# Patient Record
Sex: MALE | Race: WHITE | NOT HISPANIC OR LATINO | Employment: FULL TIME | URBAN - METROPOLITAN AREA
[De-identification: names, ages, dates, MRNs, and addresses within clinical notes are randomized per-mention and may not be internally consistent; named-entity substitution may affect disease eponyms.]

---

## 2019-09-14 ENCOUNTER — APPOINTMENT (OUTPATIENT)
Dept: GENERAL RADIOLOGY | Facility: HOSPITAL | Age: 34
End: 2019-09-14

## 2019-09-14 PROCEDURE — 73140 X-RAY EXAM OF FINGER(S): CPT | Performed by: GENERAL PRACTICE

## 2021-08-13 ENCOUNTER — HOSPITAL ENCOUNTER (EMERGENCY)
Facility: HOSPITAL | Age: 36
Discharge: HOME | End: 2021-08-13
Attending: EMERGENCY MEDICINE | Admitting: EMERGENCY MEDICINE

## 2021-08-13 VITALS
SYSTOLIC BLOOD PRESSURE: 108 MMHG | WEIGHT: 222.5 LBS | OXYGEN SATURATION: 95 % | RESPIRATION RATE: 16 BRPM | TEMPERATURE: 98.7 F | HEART RATE: 54 BPM | DIASTOLIC BLOOD PRESSURE: 69 MMHG | BODY MASS INDEX: 28.55 KG/M2 | HEIGHT: 74 IN

## 2021-08-13 DIAGNOSIS — B34.9 VIRAL ILLNESS: Primary | ICD-10-CM

## 2021-08-13 LAB
ALBUMIN SERPL-MCNC: 3.9 G/DL (ref 3.5–5.2)
ALBUMIN/GLOB SERPL: 1.3 G/DL
ALP SERPL-CCNC: 52 U/L (ref 39–117)
ALT SERPL W P-5'-P-CCNC: 18 U/L (ref 1–41)
ANION GAP SERPL CALCULATED.3IONS-SCNC: 7.8 MMOL/L (ref 5–15)
AST SERPL-CCNC: 18 U/L (ref 1–40)
BASOPHILS # BLD AUTO: 0.02 10*3/MM3 (ref 0–0.2)
BASOPHILS NFR BLD AUTO: 0.3 % (ref 0–1.5)
BILIRUB SERPL-MCNC: 1.1 MG/DL (ref 0–1.2)
BILIRUB UR QL STRIP: NEGATIVE
BUN SERPL-MCNC: 16 MG/DL (ref 6–20)
BUN/CREAT SERPL: 13.1 (ref 7–25)
CALCIUM SPEC-SCNC: 9 MG/DL (ref 8.6–10.5)
CHLORIDE SERPL-SCNC: 99 MMOL/L (ref 98–107)
CLARITY UR: CLEAR
CO2 SERPL-SCNC: 28.2 MMOL/L (ref 22–29)
COLOR UR: YELLOW
CREAT SERPL-MCNC: 1.22 MG/DL (ref 0.76–1.27)
DEPRECATED RDW RBC AUTO: 41.1 FL (ref 37–54)
EOSINOPHIL # BLD AUTO: 0.06 10*3/MM3 (ref 0–0.4)
EOSINOPHIL NFR BLD AUTO: 0.8 % (ref 0.3–6.2)
ERYTHROCYTE [DISTWIDTH] IN BLOOD BY AUTOMATED COUNT: 12.7 % (ref 12.3–15.4)
FLUAV RNA RESP QL NAA+PROBE: NOT DETECTED
FLUBV RNA RESP QL NAA+PROBE: NOT DETECTED
GFR SERPL CREATININE-BSD FRML MDRD: 67 ML/MIN/1.73
GLOBULIN UR ELPH-MCNC: 2.9 GM/DL
GLUCOSE SERPL-MCNC: 106 MG/DL (ref 65–99)
GLUCOSE UR STRIP-MCNC: NEGATIVE MG/DL
HCT VFR BLD AUTO: 44.2 % (ref 37.5–51)
HETEROPH AB SER QL LA: NEGATIVE
HGB BLD-MCNC: 14.7 G/DL (ref 13–17.7)
HGB UR QL STRIP.AUTO: NEGATIVE
IMM GRANULOCYTES # BLD AUTO: 0.01 10*3/MM3 (ref 0–0.05)
IMM GRANULOCYTES NFR BLD AUTO: 0.1 % (ref 0–0.5)
KETONES UR QL STRIP: NEGATIVE
LEUKOCYTE ESTERASE UR QL STRIP.AUTO: NEGATIVE
LIPASE SERPL-CCNC: 21 U/L (ref 13–60)
LYMPHOCYTES # BLD AUTO: 0.46 10*3/MM3 (ref 0.7–3.1)
LYMPHOCYTES NFR BLD AUTO: 6.1 % (ref 19.6–45.3)
MCH RBC QN AUTO: 29.3 PG (ref 26.6–33)
MCHC RBC AUTO-ENTMCNC: 33.3 G/DL (ref 31.5–35.7)
MCV RBC AUTO: 88 FL (ref 79–97)
MONOCYTES # BLD AUTO: 0.19 10*3/MM3 (ref 0.1–0.9)
MONOCYTES NFR BLD AUTO: 2.5 % (ref 5–12)
NEUTROPHILS NFR BLD AUTO: 6.83 10*3/MM3 (ref 1.7–7)
NEUTROPHILS NFR BLD AUTO: 90.2 % (ref 42.7–76)
NITRITE UR QL STRIP: NEGATIVE
NRBC BLD AUTO-RTO: 0 /100 WBC (ref 0–0.2)
PH UR STRIP.AUTO: <=5 [PH] (ref 4.5–8)
PLATELET # BLD AUTO: 168 10*3/MM3 (ref 140–450)
PMV BLD AUTO: 8.7 FL (ref 6–12)
POTASSIUM SERPL-SCNC: 3.9 MMOL/L (ref 3.5–5.2)
PROT SERPL-MCNC: 6.8 G/DL (ref 6–8.5)
PROT UR QL STRIP: NEGATIVE
RBC # BLD AUTO: 5.02 10*6/MM3 (ref 4.14–5.8)
SARS-COV-2 RNA RESP QL NAA+PROBE: NOT DETECTED
SODIUM SERPL-SCNC: 135 MMOL/L (ref 136–145)
SP GR UR STRIP: 1.02 (ref 1–1.03)
UROBILINOGEN UR QL STRIP: NORMAL
WBC # BLD AUTO: 7.57 10*3/MM3 (ref 3.4–10.8)

## 2021-08-13 PROCEDURE — 81003 URINALYSIS AUTO W/O SCOPE: CPT | Performed by: PHYSICIAN ASSISTANT

## 2021-08-13 PROCEDURE — 86308 HETEROPHILE ANTIBODY SCREEN: CPT | Performed by: PHYSICIAN ASSISTANT

## 2021-08-13 PROCEDURE — 85025 COMPLETE CBC W/AUTO DIFF WBC: CPT | Performed by: PHYSICIAN ASSISTANT

## 2021-08-13 PROCEDURE — 96374 THER/PROPH/DIAG INJ IV PUSH: CPT

## 2021-08-13 PROCEDURE — 83690 ASSAY OF LIPASE: CPT | Performed by: PHYSICIAN ASSISTANT

## 2021-08-13 PROCEDURE — 99284 EMERGENCY DEPT VISIT MOD MDM: CPT

## 2021-08-13 PROCEDURE — 80053 COMPREHEN METABOLIC PANEL: CPT | Performed by: PHYSICIAN ASSISTANT

## 2021-08-13 PROCEDURE — 25010000002 ONDANSETRON PER 1 MG: Performed by: PHYSICIAN ASSISTANT

## 2021-08-13 PROCEDURE — 87636 SARSCOV2 & INF A&B AMP PRB: CPT | Performed by: PHYSICIAN ASSISTANT

## 2021-08-13 RX ORDER — ONDANSETRON 2 MG/ML
4 INJECTION INTRAMUSCULAR; INTRAVENOUS ONCE
Status: COMPLETED | OUTPATIENT
Start: 2021-08-13 | End: 2021-08-13

## 2021-08-13 RX ORDER — ACETAMINOPHEN 500 MG
1000 TABLET ORAL ONCE
Status: COMPLETED | OUTPATIENT
Start: 2021-08-13 | End: 2021-08-13

## 2021-08-13 RX ORDER — SODIUM CHLORIDE 0.9 % (FLUSH) 0.9 %
10 SYRINGE (ML) INJECTION AS NEEDED
Status: DISCONTINUED | OUTPATIENT
Start: 2021-08-13 | End: 2021-08-13 | Stop reason: HOSPADM

## 2021-08-13 RX ORDER — IBUPROFEN 400 MG/1
800 TABLET ORAL ONCE
Status: COMPLETED | OUTPATIENT
Start: 2021-08-13 | End: 2021-08-13

## 2021-08-13 RX ORDER — DIPHENOXYLATE HYDROCHLORIDE AND ATROPINE SULFATE 2.5; .025 MG/1; MG/1
1 TABLET ORAL DAILY
COMMUNITY

## 2021-08-13 RX ADMIN — IBUPROFEN 800 MG: 400 TABLET ORAL at 18:31

## 2021-08-13 RX ADMIN — ONDANSETRON 4 MG: 2 INJECTION INTRAMUSCULAR; INTRAVENOUS at 18:41

## 2021-08-13 RX ADMIN — SODIUM CHLORIDE 1000 ML: 9 INJECTION, SOLUTION INTRAVENOUS at 18:42

## 2021-08-13 RX ADMIN — ACETAMINOPHEN 1000 MG: 500 TABLET, FILM COATED ORAL at 18:32

## 2021-08-13 NOTE — ED PROVIDER NOTES
EMERGENCY DEPARTMENT ENCOUNTER      Room Number: 08/08    History is provided by the patient, no translation services needed    HPI:    Chief complaint: Vomiting  Location:     Quality/Severity: More than 5 times     Timing/Duration: 5 AM this morning    Modifying Factors: Worse with eating    Associated Symptoms:  fatigue, chills, diarrhea, sinus pressure, headache    Narrative: Pt is a 36 y.o. male who presents complaining of vomiting and diarrhea since this morning.  Patient states that he is also had fatigue and headache.  Patient is complaining of sinus pressure.  Patient states that he had someone in his office that was COVID-19 positive.  Patient states that he has been vaccinated with the COVID-19 vaccine.  Patient denies any shortness of breath.      PMD: Martin Singer MD    REVIEW OF SYSTEMS  Review of Systems   Constitutional: Positive for activity change, appetite change, chills and fever.   HENT: Positive for congestion, sinus pressure and sinus pain.    Gastrointestinal: Positive for diarrhea, nausea and vomiting.         PAST MEDICAL HISTORY  Active Ambulatory Problems     Diagnosis Date Noted   • No Active Ambulatory Problems     Resolved Ambulatory Problems     Diagnosis Date Noted   • No Resolved Ambulatory Problems     Past Medical History:   Diagnosis Date   • Asthma    • Depression        PAST SURGICAL HISTORY  Past Surgical History:   Procedure Laterality Date   • KNEE SURGERY         FAMILY HISTORY  Family History   Problem Relation Age of Onset   • No Known Problems Mother    • No Known Problems Father    • Asthma Sister    • Stroke Paternal Grandfather        SOCIAL HISTORY  Social History     Socioeconomic History   • Marital status:      Spouse name: Not on file   • Number of children: Not on file   • Years of education: Not on file   • Highest education level: Not on file   Tobacco Use   • Smoking status: Former Smoker   • Smokeless tobacco: Never Used   Substance and Sexual  Activity   • Alcohol use: Yes     Comment: occ   • Drug use: Never   • Sexual activity: Defer       ALLERGIES  Patient has no known allergies.      Current Facility-Administered Medications:   •  [COMPLETED] Insert peripheral IV, , , Once **AND** sodium chloride 0.9 % flush 10 mL, 10 mL, Intravenous, PRN, Nasrin Arshad PA-C    Current Outpatient Medications:   •  multivitamin (THERAGRAN) tablet tablet, Take 1 tablet by mouth Daily., Disp: , Rfl:   •  albuterol sulfate HFA (VENTOLIN HFA) 108 (90 Base) MCG/ACT inhaler, Ventolin HFA 90 mcg/actuation aerosol inhaler, Disp: , Rfl:   •  escitalopram (LEXAPRO) 10 MG tablet, Take 10 mg by mouth Daily., Disp: , Rfl:   •  ibuprofen (ADVIL,MOTRIN) 800 MG tablet, Take 1 tablet by mouth Every 6 (Six) Hours As Needed for Mild Pain  or Moderate Pain ., Disp: 30 tablet, Rfl: 0    PHYSICAL EXAM  ED Triage Vitals [08/13/21 1700]   Temp Heart Rate Resp BP SpO2   (!) 101.5 °F (38.6 °C) 96 16 133/87 97 %      Temp src Heart Rate Source Patient Position BP Location FiO2 (%)   Oral Monitor Sitting Right arm --       Physical Exam  Vitals and nursing note reviewed.   HENT:      Head: Normocephalic and atraumatic.   Eyes:      Conjunctiva/sclera: Conjunctivae normal.   Cardiovascular:      Rate and Rhythm: Normal rate and regular rhythm.      Heart sounds: Normal heart sounds.   Pulmonary:      Effort: Pulmonary effort is normal. No respiratory distress.      Breath sounds: Normal breath sounds.   Abdominal:      General: Bowel sounds are normal. There is no distension.      Palpations: Abdomen is soft.      Tenderness: There is no abdominal tenderness.   Musculoskeletal:         General: Normal range of motion.      Cervical back: Normal range of motion and neck supple.   Skin:     General: Skin is warm and dry.   Neurological:      Mental Status: He is alert and oriented to person, place, and time.   Psychiatric:         Mood and Affect: Mood and affect normal.           LAB  RESULTS  Lab Results (last 24 hours)     Procedure Component Value Units Date/Time    COVID-19 and FLU A/B PCR - Swab, Nasopharynx [[id number]]  (Normal) Collected: 08/13/21 1812    Specimen: Swab from Nasopharynx Updated: 08/13/21 1845     COVID19 Not Detected     Influenza A PCR Not Detected     Influenza B PCR Not Detected    Narrative:      Fact sheet for providers: https://www.fda.gov/media/348633/download    Fact sheet for patients: https://www.fda.gov/media/463861/download    Test performed by PCR.    CBC & Differential [[id number]]  (Abnormal) Collected: 08/13/21 1839    Specimen: Blood Updated: 08/13/21 1854    Narrative:      The following orders were created for panel order CBC & Differential.  Procedure                               Abnormality         Status                     ---------                               -----------         ------                     CBC Auto Differential[[id number]]         Abnormal            Final result                 Please view results for these tests on the individual orders.    Comprehensive Metabolic Panel [[id number]]  (Abnormal) Collected: 08/13/21 1839    Specimen: Blood Updated: 08/13/21 1924     Glucose 106 mg/dL      BUN 16 mg/dL      Creatinine 1.22 mg/dL      Sodium 135 mmol/L      Potassium 3.9 mmol/L      Chloride 99 mmol/L      CO2 28.2 mmol/L      Calcium 9.0 mg/dL      Total Protein 6.8 g/dL      Albumin 3.90 g/dL      ALT (SGPT) 18 U/L      AST (SGOT) 18 U/L      Alkaline Phosphatase 52 U/L      Total Bilirubin 1.1 mg/dL      eGFR Non African Amer 67 mL/min/1.73      Globulin 2.9 gm/dL      A/G Ratio 1.3 g/dL      BUN/Creatinine Ratio 13.1     Anion Gap 7.8 mmol/L     Narrative:      GFR Normal >60  Chronic Kidney Disease <60  Kidney Failure <15      Lipase [[id number]]  (Normal) Collected: 08/13/21 1839    Specimen: Blood Updated: 08/13/21 1924     Lipase 21 U/L     CBC Auto Differential [[id number]]  (Abnormal) Collected: 08/13/21 1839    Specimen: Blood  Updated: 08/13/21 1854     WBC 7.57 10*3/mm3      RBC 5.02 10*6/mm3      Hemoglobin 14.7 g/dL      Hematocrit 44.2 %      MCV 88.0 fL      MCH 29.3 pg      MCHC 33.3 g/dL      RDW 12.7 %      RDW-SD 41.1 fl      MPV 8.7 fL      Platelets 168 10*3/mm3      Neutrophil % 90.2 %      Lymphocyte % 6.1 %      Monocyte % 2.5 %      Eosinophil % 0.8 %      Basophil % 0.3 %      Immature Grans % 0.1 %      Neutrophils, Absolute 6.83 10*3/mm3      Lymphocytes, Absolute 0.46 10*3/mm3      Monocytes, Absolute 0.19 10*3/mm3      Eosinophils, Absolute 0.06 10*3/mm3      Basophils, Absolute 0.02 10*3/mm3      Immature Grans, Absolute 0.01 10*3/mm3      nRBC 0.0 /100 WBC     Mononucleosis Screen [[id number]]  (Normal) Collected: 08/13/21 1839    Specimen: Blood Updated: 08/13/21 1930     Monospot Negative    Urinalysis With Microscopic If Indicated (No Culture) - Urine, Clean Catch [[id number]]  (Normal) Collected: 08/13/21 1909    Specimen: Urine, Clean Catch Updated: 08/13/21 1917     Color, UA Yellow     Appearance, UA Clear     pH, UA <=5.0     Specific Gravity, UA 1.025     Glucose, UA Negative     Ketones, UA Negative     Bilirubin, UA Negative     Blood, UA Negative     Protein, UA Negative     Leuk Esterase, UA Negative     Nitrite, UA Negative     Urobilinogen, UA 0.2 E.U./dL    Narrative:      Urine microscopic not indicated.            I ordered the above labs and reviewed the results    RADIOLOGY  No Radiology Exams Resulted Within Past 24 Hours        PROCEDURES  Procedures      PROGRESS AND CONSULTS  ED Course as of Aug 13 2031   Fri Aug 13, 2021   1856 WBC: 7.57 [GT]   2026 36-year-old male presents to the ED with complaints of vomiting and diarrhea, fatigue, fever and chills times today. Patient denies any known sick contacts. Denies any chest pain or shortness of breath. After history and physical exam patient's vitals were noted to have an elevated temperature of 101.5. Patient's laboratory studies showed a normal  white blood cell count. Patient's urine showed no signs of infection. Patient was negative for Covid and the flu. Patient's abdomen was soft and nontender. While in the ED patient received Tylenol and Motrin along with Zofran. Patient also received a liter of fluids. I discussed with patient that I felt this is most likely due to a viral infection. Discussed with patient the importance of him hydrating. Discussed with patient that if he had any worsening symptoms that he should return to the ED. Patient expressed verbal understanding of plan of care.    [GT]      ED Course User Index  [GT] Nasrin Arshad PA-C           MEDICAL DECISION MAKING    MDM       DIAGNOSIS  Final diagnoses:   Viral illness       Latest Documented Vital Signs:  As of 20:31 EDT  BP- 108/69 HR- 54 Temp- 98.7 °F (37.1 °C) (Oral) O2 sat- 95%    DISPOSITION  Discharged home        Discussed pertinent findings with the patient/family.  Patient/Family voiced understanding of need to follow-up for recheck and further testing as needed.  Return to the Emergency Department warnings were given.         Medication List      No changes were made to your prescriptions during this visit.             Follow-up Information     Martin Singer MD. Call in 1 day.    Specialty: Family Medicine  Why: To schedule a follow up appointment  Contact information:  [facility]  [place] [facility] [address] [phone]                     Dictated utilizing Dragon dictation     Nasrin Arshad PA-C  08/13/21 2030

## 2021-10-04 ENCOUNTER — LAB (OUTPATIENT)
Dept: OTHER | Facility: HOSPITAL | Age: 36
End: 2021-10-04

## 2021-10-04 ENCOUNTER — CONSULT (OUTPATIENT)
Dept: ONCOLOGY | Facility: CLINIC | Age: 36
End: 2021-10-04

## 2021-10-04 VITALS
TEMPERATURE: 97.7 F | HEIGHT: 74 IN | OXYGEN SATURATION: 98 % | BODY MASS INDEX: 27.32 KG/M2 | HEART RATE: 76 BPM | DIASTOLIC BLOOD PRESSURE: 83 MMHG | RESPIRATION RATE: 16 BRPM | SYSTOLIC BLOOD PRESSURE: 133 MMHG | WEIGHT: 212.9 LBS

## 2021-10-04 DIAGNOSIS — R16.1 SPLENOMEGALY: Primary | ICD-10-CM

## 2021-10-04 LAB
BASOPHILS # BLD AUTO: 0.02 10*3/MM3 (ref 0–0.2)
BASOPHILS NFR BLD AUTO: 0.3 % (ref 0–1.5)
DEPRECATED RDW RBC AUTO: 39.5 FL (ref 37–54)
EOSINOPHIL # BLD AUTO: 0.24 10*3/MM3 (ref 0–0.4)
EOSINOPHIL NFR BLD AUTO: 3.6 % (ref 0.3–6.2)
ERYTHROCYTE [DISTWIDTH] IN BLOOD BY AUTOMATED COUNT: 12.3 % (ref 12.3–15.4)
HCT VFR BLD AUTO: 48.7 % (ref 37.5–51)
HGB BLD-MCNC: 16.3 G/DL (ref 13–17.7)
IMM GRANULOCYTES # BLD AUTO: 0.02 10*3/MM3 (ref 0–0.05)
IMM GRANULOCYTES NFR BLD AUTO: 0.3 % (ref 0–0.5)
LYMPHOCYTES # BLD AUTO: 2.69 10*3/MM3 (ref 0.7–3.1)
LYMPHOCYTES NFR BLD AUTO: 40.5 % (ref 19.6–45.3)
MCH RBC QN AUTO: 29 PG (ref 26.6–33)
MCHC RBC AUTO-ENTMCNC: 33.5 G/DL (ref 31.5–35.7)
MCV RBC AUTO: 86.7 FL (ref 79–97)
MONOCYTES # BLD AUTO: 0.36 10*3/MM3 (ref 0.1–0.9)
MONOCYTES NFR BLD AUTO: 5.4 % (ref 5–12)
NEUTROPHILS NFR BLD AUTO: 3.31 10*3/MM3 (ref 1.7–7)
NEUTROPHILS NFR BLD AUTO: 49.9 % (ref 42.7–76)
NRBC BLD AUTO-RTO: 0 /100 WBC (ref 0–0.2)
PLATELET # BLD AUTO: 178 10*3/MM3 (ref 140–450)
PMV BLD AUTO: 8.9 FL (ref 6–12)
RBC # BLD AUTO: 5.62 10*6/MM3 (ref 4.14–5.8)
WBC # BLD AUTO: 6.64 10*3/MM3 (ref 3.4–10.8)

## 2021-10-04 PROCEDURE — 85025 COMPLETE CBC W/AUTO DIFF WBC: CPT | Performed by: INTERNAL MEDICINE

## 2021-10-04 PROCEDURE — 99205 OFFICE O/P NEW HI 60 MIN: CPT | Performed by: INTERNAL MEDICINE

## 2021-10-04 PROCEDURE — 36415 COLL VENOUS BLD VENIPUNCTURE: CPT

## 2021-10-04 RX ORDER — OMEPRAZOLE 40 MG/1
40 CAPSULE, DELAYED RELEASE ORAL DAILY
COMMUNITY
Start: 2021-09-29

## 2021-10-04 RX ORDER — MULTIVIT-MIN/FOLIC/VIT K/LYCOP 400-20-370
250 TABLET ORAL DAILY
COMMUNITY
Start: 2021-09-29

## 2021-10-04 NOTE — PROGRESS NOTES
[facility]    CONSULTING IN BLOOD DISORDERS & CANCER      REASON FOR CONSULTATION/CHIEF COMPLAINT:     Evaluation & management for splenomegaly                             REQUESTING PHYSICIAN: Martin Singer MD  RECORDS OBTAINED:  Records of the patients history including those from the electronic medical record were reviewed and summarized in detail.    HISTORY OF PRESENT ILLNESS:    The patient is a 36 y.o. year old male with no major past medical history had presented to his PCP with RUQ abdominal pain, heart burns & rapid weight loss in September 2021. A CMP from 9/10/21 revealed elevated serum creatinine of 1.33. An US abdomen from 9/21/21 showed borderline enlarged spleen measuring 12.9 cm. Liver was unremarkable, measuring 16.8 cm. This prompted referral to this clinic.   Patient reports he is an avid runner & may have ran more than usual prior to presentation to his PCP last month. Says his RUQ pain has resolved now. Repeat serum creatinine too has normalized. An EGD performed in end of September 2021 showed signs of block's esophagus. No other acute abnormalities. He has been started on PPI.     Today, he mainly reports of left lower abdominal pain. Denies any fever, chills, night sweats or any weight loss. No palpable lymphadenopathy. Denies tobacco, alcohol or drug abuse. No family hx blood or bone marrow disorder.      Past Medical History:   Diagnosis Date   • Asthma    • Depression      Past Surgical History:   Procedure Laterality Date   • KNEE SURGERY         MEDICATIONS    Current Outpatient Medications:   •  ibuprofen (ADVIL,MOTRIN) 800 MG tablet, Take 1 tablet by mouth Every 6 (Six) Hours As Needed for Mild Pain  or Moderate Pain ., Disp: 30 tablet, Rfl: 0  •  multivitamin (THERAGRAN) tablet tablet, Take 1 tablet by mouth Daily., Disp: , Rfl:   •  omeprazole (priLOSEC) 40 MG capsule, Take 40 mg by mouth Daily., Disp: , Rfl:   •  albuterol sulfate HFA (VENTOLIN HFA) 108 (90 Base) MCG/ACT  "inhaler, Ventolin HFA 90 mcg/actuation aerosol inhaler, Disp: , Rfl:   •  [employer] Digestive Probiotic 250 MG capsule, Take 250 mg by mouth Daily., Disp: , Rfl:   •  escitalopram (LEXAPRO) 10 MG tablet, Take 10 mg by mouth Daily., Disp: , Rfl:     ALLERGIES:   No Known Allergies    SOCIAL HISTORY:       Social History     Socioeconomic History   • Marital status:      Spouse name: Not on file   • Number of children: Not on file   • Years of education: Not on file   • Highest education level: Not on file   Tobacco Use   • Smoking status: Former Smoker   • Smokeless tobacco: Never Used   Substance and Sexual Activity   • Alcohol use: Yes     Comment: occ   • Drug use: Never   • Sexual activity: Defer         FAMILY HISTORY:  Family History   Problem Relation Age of Onset   • No Known Problems Mother    • No Known Problems Father    • Asthma Sister    • Stroke Paternal Grandfather        REVIEW OF SYSTEMS:  Constitutional: [No fevers, chills, sweats]  Eye: [No recent visual problems]  ENMT: [No ear pain, nasal congestion, sore throat]  Respiratory: [No shortness of breath, cough]  Cardiovascular: [No Chest pain, palpitations, syncope]  Gastrointestinal: [No nausea, vomiting, diarrhea]  Genitourinary: [No hematuria]  Hema/Lymph: [Negative for bruising tendency, swollen lymph glands]  Endocrine: [Negative for excessive thirst, excessive hunger]  Musculoskeletal: [Denies any musculoskeletal pain or swelling]  Integumentary: [No rash, pruritus, abrasions]  Neurologic: [ No weakness or numbness, Alert & oriented X 4]         Vitals:    10/04/21 0839   BP: 133/83   Pulse: 76   Resp: 16   Temp: 97.7 °F (36.5 °C)   TempSrc: Temporal   SpO2: 98%   Weight: 96.6 kg (212 lb 14.4 oz)   Height: 188.4 cm (74.17\")   PainSc: 0-No pain     No flowsheet data found.   PHYSICAL EXAM:    CONSTITUTIONAL:  Vital signs reviewed.  No distress, looks comfortable.  EYES:  Conjunctiva and lids unremarkable.   EARS,NOSE,MOUTH,THROAT:  Ears and " nose appear unremarkable.  Lips, teeth, gums appear unremarkable.  RESPIRATORY:  Normal respiratory effort.  Lungs clear to auscultation bilaterally.  CARDIOVASCULAR:  Normal S1, S2.  No murmurs rubs or gallops.  No significant lower extremity edema.  GASTROINTESTINAL: Abdomen appears unremarkable.  Nontender.  No hepatomegaly.  No splenomegaly.  LYMPHATIC:  No cervical, supraclavicular lymphadenopathy.  NEURO: cranial nerves 2-12 grossly intact.  No focal deficits.  Appears to have equal strength all 4 extremities.  MUSCULOSKELETAL:  Unremarkable digits/nails.  No cyanosis or clubbing.  No apparent joint deformities.  SKIN:  Warm.  No rashes.  PSYCHIATRIC:  Normal judgment and insight.  Normal mood and affect.     RECENT LABS:        Lab on 10/04/2021   Component Date Value Ref Range Status   • WBC 10/04/2021 6.64  3.40 - 10.80 10*3/mm3 Final   • RBC 10/04/2021 5.62  4.14 - 5.80 10*6/mm3 Final   • Hemoglobin 10/04/2021 16.3  13.0 - 17.7 g/dL Final   • Hematocrit 10/04/2021 48.7  37.5 - 51.0 % Final   • MCV 10/04/2021 86.7  79.0 - 97.0 fL Final   • MCH 10/04/2021 29.0  26.6 - 33.0 pg Final   • MCHC 10/04/2021 33.5  31.5 - 35.7 g/dL Final   • RDW 10/04/2021 12.3  12.3 - 15.4 % Final   • RDW-SD 10/04/2021 39.5  37.0 - 54.0 fl Final   • MPV 10/04/2021 8.9  6.0 - 12.0 fL Final   • Platelets 10/04/2021 178  140 - 450 10*3/mm3 Final   • Neutrophil % 10/04/2021 49.9  42.7 - 76.0 % Final   • Lymphocyte % 10/04/2021 40.5  19.6 - 45.3 % Final   • Monocyte % 10/04/2021 5.4  5.0 - 12.0 % Final   • Eosinophil % 10/04/2021 3.6  0.3 - 6.2 % Final   • Basophil % 10/04/2021 0.3  0.0 - 1.5 % Final   • Immature Grans % 10/04/2021 0.3  0.0 - 0.5 % Final   • Neutrophils, Absolute 10/04/2021 3.31  1.70 - 7.00 10*3/mm3 Final   • Lymphocytes, Absolute 10/04/2021 2.69  0.70 - 3.10 10*3/mm3 Final   • Monocytes, Absolute 10/04/2021 0.36  0.10 - 0.90 10*3/mm3 Final   • Eosinophils, Absolute 10/04/2021 0.24  0.00 - 0.40 10*3/mm3 Final   •  Basophils, Absolute 10/04/2021 0.02  0.00 - 0.20 10*3/mm3 Final   • Immature Grans, Absolute 10/04/2021 0.02  0.00 - 0.05 10*3/mm3 Final   • nRBC 10/04/2021 0.0  0.0 - 0.2 /100 WBC Final         ASSESSMENT:   is a pleasant 35 y/o WM with past medical history significant for anxiety/depression comes to this clinic for splenomegaly evaluation & management.     # Splenomegaly  · Patient had presented with RUQ abdominal pain, heart burn, rapid weight loss in early September 2021. Serum creatinine had was elevated at 1.33. US abdomen showed borderline enlarged splenomegaly.   · Discussed various causes for splenomegaly, including infectious (HIV, hepatitis, EBV etc), hemolytic disease/extramedullary hematopoiesis, infiltrative (sarcoidosis, gaucher's, SLE etc), thrombosis and hematologic malignancies.   · Patient does not have any acute abdominal pain, fever, chills, night sweats or weight loss at this time. In fact, the only symptom he reports of today is mild left lower abdominal pain. He does not have any risk factors for HIV or hepatitis infections. LFTs within normal limits. Hemoglobin stable.   · Low suspicion for lymphoma or any other heme malignancy. Suspect borderline enlarged splenomegaly could have been related to reactive finding.  · Discussed plan to obtain a CT a/p for better evaluation of the splenic size and possible lymphadenopathy.   · If findings within normal limits, will plan to monitor for now.   · Patient is agreeable to the plan  ·   PLAN:   - CT a/p now  - F/u in 1 week or sooner if needed    Orders Placed This Encounter   Procedures   • CT Chest With Contrast Diagnostic     Standing Status:   Future     Standing Expiration Date:   10/4/2022   • CT Abdomen Pelvis With Contrast     Standing Status:   Future     Standing Expiration Date:   10/4/2022     Order Specific Question:   Will Oral Contrast be needed for this procedure?     Answer:   Yes       Total time spent during this patient  encounter is 65 minutes. The total time spent with the patient includes at least one or more of the following: preparing to see the patient by reviewing of tests, prior notes or other relevant information, performing appropriate independent examination & evaluation, counseling, ordering of medications, tests or procedures, communicating with other healthcare professionals, when appropriate to coordinate care, documenting clinic information in the electronic medical records or other health records, independently interpreting results of tests and communicating the results to the patient/family or caregiver.

## 2021-10-05 ENCOUNTER — HOSPITAL ENCOUNTER (OUTPATIENT)
Dept: CT IMAGING | Facility: HOSPITAL | Age: 36
Discharge: HOME | End: 2021-10-05
Admitting: INTERNAL MEDICINE

## 2021-10-05 DIAGNOSIS — R16.1 SPLENOMEGALY: ICD-10-CM

## 2021-10-05 PROCEDURE — 74177 CT ABD & PELVIS W/CONTRAST: CPT

## 2021-10-05 PROCEDURE — 0 DIATRIZOATE MEGLUMINE & SODIUM PER 1 ML: Performed by: INTERNAL MEDICINE

## 2021-10-05 PROCEDURE — 0 IOPAMIDOL PER 1 ML: Performed by: INTERNAL MEDICINE

## 2021-10-05 RX ADMIN — IOPAMIDOL 100 ML: 755 INJECTION, SOLUTION INTRAVENOUS at 11:10

## 2021-10-05 RX ADMIN — DIATRIZOATE MEGLUMINE AND DIATRIZOATE SODIUM 30 ML: 600; 100 SOLUTION ORAL; RECTAL at 09:30

## 2021-10-07 ENCOUNTER — APPOINTMENT (OUTPATIENT)
Dept: OTHER | Facility: HOSPITAL | Age: 36
End: 2021-10-07

## 2021-10-15 ENCOUNTER — TELEMEDICINE (OUTPATIENT)
Dept: ONCOLOGY | Facility: CLINIC | Age: 36
End: 2021-10-15

## 2021-10-15 DIAGNOSIS — R16.1 SPLENOMEGALY: Primary | ICD-10-CM

## 2021-10-15 PROCEDURE — 99214 OFFICE O/P EST MOD 30 MIN: CPT | Performed by: INTERNAL MEDICINE

## 2021-10-15 NOTE — PROGRESS NOTES
[facility]    CONSULTING IN BLOOD DISORDERS & CANCER      REASON FOR CONSULTATION/CHIEF COMPLAINT:     Evaluation & management for splenomegaly                             REQUESTING PHYSICIAN: Martin Singer MD  RECORDS OBTAINED:  Records of the patients history including those from the electronic medical record were reviewed and summarized in detail.    HISTORY OF PRESENT ILLNESS:    The patient is a 36 y.o. year old male with no major past medical history had presented to his PCP with RUQ abdominal pain, heart burns & rapid weight loss in September 2021. A CMP from 9/10/21 revealed elevated serum creatinine of 1.33. An US abdomen from 9/21/21 showed borderline enlarged spleen measuring 12.9 cm. Liver was unremarkable, measuring 16.8 cm. This prompted referral to this clinic.   Patient reports he is an avid runner & may have ran more than usual prior to presentation to his PCP last month. Says his RUQ pain has resolved now. Repeat serum creatinine too has normalized. An EGD performed in end of September 2021 showed signs of block's esophagus. No other acute abnormalities. He has been started on PPI.     Patient reported of mainly reports of left lower abdominal pain. Denies any fever, chills, night sweats or any weight loss. No palpable lymphadenopathy. Denies tobacco, alcohol or drug abuse. No family hx blood or bone marrow disorder.    INTERIM HISTORY:  Performed a telehealth visit. Patient denies any major complaints today.       Past Medical History:   Diagnosis Date   • Asthma    • Depression      Past Surgical History:   Procedure Laterality Date   • KNEE SURGERY         MEDICATIONS    Current Outpatient Medications:   •  albuterol sulfate HFA (VENTOLIN HFA) 108 (90 Base) MCG/ACT inhaler, Ventolin HFA 90 mcg/actuation aerosol inhaler, Disp: , Rfl:   •  [employer] Digestive Probiotic 250 MG capsule, Take 250 mg by mouth Daily., Disp: , Rfl:   •  escitalopram (LEXAPRO) 10 MG tablet, Take 10 mg by mouth Daily.,  Disp: , Rfl:   •  ibuprofen (ADVIL,MOTRIN) 800 MG tablet, Take 1 tablet by mouth Every 6 (Six) Hours As Needed for Mild Pain  or Moderate Pain ., Disp: 30 tablet, Rfl: 0  •  multivitamin (THERAGRAN) tablet tablet, Take 1 tablet by mouth Daily., Disp: , Rfl:   •  omeprazole (priLOSEC) 40 MG capsule, Take 40 mg by mouth Daily., Disp: , Rfl:     ALLERGIES:   No Known Allergies    SOCIAL HISTORY:       Social History     Socioeconomic History   • Marital status:    Tobacco Use   • Smoking status: Former Smoker   • Smokeless tobacco: Never Used   Substance and Sexual Activity   • Alcohol use: Yes     Comment: occ   • Drug use: Never   • Sexual activity: Defer         FAMILY HISTORY:  Family History   Problem Relation Age of Onset   • No Known Problems Mother    • No Known Problems Father    • Asthma Sister    • Stroke Paternal Grandfather        REVIEW OF SYSTEMS:  Constitutional: [No fevers, chills, sweats]  Eye: [No recent visual problems]  ENMT: [No ear pain, nasal congestion, sore throat]  Respiratory: [No shortness of breath, cough]  Cardiovascular: [No Chest pain, palpitations, syncope]  Gastrointestinal: [No nausea, vomiting, diarrhea]  Genitourinary: [No hematuria]  Hema/Lymph: [Negative for bruising tendency, swollen lymph glands]  Endocrine: [Negative for excessive thirst, excessive hunger]  Musculoskeletal: [Denies any musculoskeletal pain or swelling]  Integumentary: [No rash, pruritus, abrasions]  Neurologic: [ No weakness or numbness, Alert & oriented X 4]         There were no vitals filed for this visit.  No flowsheet data found.   PHYSICAL EXAM:    Not performed     RECENT LABS:        No visits with results within 7 Day(s) from this visit.   Latest known visit with results is:   Lab on 10/04/2021   Component Date Value Ref Range Status   • WBC 10/04/2021 6.64  3.40 - 10.80 10*3/mm3 Final   • RBC 10/04/2021 5.62  4.14 - 5.80 10*6/mm3 Final   • Hemoglobin 10/04/2021 16.3  13.0 - 17.7 g/dL Final   •  Hematocrit 10/04/2021 48.7  37.5 - 51.0 % Final   • MCV 10/04/2021 86.7  79.0 - 97.0 fL Final   • MCH 10/04/2021 29.0  26.6 - 33.0 pg Final   • MCHC 10/04/2021 33.5  31.5 - 35.7 g/dL Final   • RDW 10/04/2021 12.3  12.3 - 15.4 % Final   • RDW-SD 10/04/2021 39.5  37.0 - 54.0 fl Final   • MPV 10/04/2021 8.9  6.0 - 12.0 fL Final   • Platelets 10/04/2021 178  140 - 450 10*3/mm3 Final   • Neutrophil % 10/04/2021 49.9  42.7 - 76.0 % Final   • Lymphocyte % 10/04/2021 40.5  19.6 - 45.3 % Final   • Monocyte % 10/04/2021 5.4  5.0 - 12.0 % Final   • Eosinophil % 10/04/2021 3.6  0.3 - 6.2 % Final   • Basophil % 10/04/2021 0.3  0.0 - 1.5 % Final   • Immature Grans % 10/04/2021 0.3  0.0 - 0.5 % Final   • Neutrophils, Absolute 10/04/2021 3.31  1.70 - 7.00 10*3/mm3 Final   • Lymphocytes, Absolute 10/04/2021 2.69  0.70 - 3.10 10*3/mm3 Final   • Monocytes, Absolute 10/04/2021 0.36  0.10 - 0.90 10*3/mm3 Final   • Eosinophils, Absolute 10/04/2021 0.24  0.00 - 0.40 10*3/mm3 Final   • Basophils, Absolute 10/04/2021 0.02  0.00 - 0.20 10*3/mm3 Final   • Immature Grans, Absolute 10/04/2021 0.02  0.00 - 0.05 10*3/mm3 Final   • nRBC 10/04/2021 0.0  0.0 - 0.2 /100 WBC Final         ASSESSMENT:   is a pleasant 37 y/o WM with past medical history significant for anxiety/depression comes to this clinic for splenomegaly evaluation & management.     # Splenomegaly  · Patient had presented with RUQ abdominal pain, heart burn, rapid weight loss in early September 2021. Serum creatinine had was elevated at 1.33. US abdomen showed borderline enlarged splenomegaly.   · Discussed various causes for splenomegaly, including infectious (HIV, hepatitis, EBV etc), hemolytic disease/extramedullary hematopoiesis, infiltrative (sarcoidosis, gaucher's, SLE etc), thrombosis and hematologic malignancies.   · Patient does not have any acute abdominal pain, fever, chills, night sweats or weight loss at this time. In fact, the only symptom he reports of today is  mild left lower abdominal pain. He does not have any risk factors for HIV or hepatitis infections. LFTs within normal limits. Hemoglobin stable.   · Low suspicion for lymphoma or any other heme malignancy. Suspect borderline enlarged splenomegaly could have been related to reactive finding.  · A CT a/p performed 10/5/21 showed borderline increased spleen size of 12.4 cm. No lymphadenopathy or any other acute abnormalities. Informed patient that his increased spleen size is likely a normal variation due to based on gender and weight/height. Taller, heavier & men usually have bigger spleen size. Patient does not have any signs/symptoms of lymphoproliferative diseases.   · Discussed plan to monitor for now. Will plan to repeat US abd in 4-5 months time. Patient was advised to call if he has any new/persistent symptoms.   ·   PLAN:   - Continue monitoring  - US abd in 4 months  - F/u in 4-5 months or sooner if needed    Orders Placed This Encounter   Procedures   • US Abdomen Limited     Standing Status:   Future     Standing Expiration Date:   10/17/2022     Order Specific Question:   Reason for Exam:     Answer:   evaluate for splenomegaly     Order Specific Question:   Release to patient     Answer:   Immediate       You have chosen to receive care through a telephone visit. Do you consent to use a telephone visit for your medical care today? Yes    Total time spent during this patient encounter is 35 minutes. The total time spent with the patient includes at least one or more of the following: preparing to see the patient by reviewing of tests, prior notes or other relevant information, performing appropriate independent examination & evaluation, counseling, ordering of medications, tests or procedures, communicating with other healthcare professionals, when appropriate to coordinate care, documenting clinic information in the electronic medical records or other health records, independently interpreting results of  tests and communicating the results to the patient/family or caregiver.

## 2021-10-18 DIAGNOSIS — R16.1 SPLENOMEGALY: Primary | ICD-10-CM

## 2022-03-16 ENCOUNTER — HOSPITAL ENCOUNTER (OUTPATIENT)
Dept: ULTRASOUND IMAGING | Facility: HOSPITAL | Age: 37
Discharge: HOME | End: 2022-03-16
Admitting: INTERNAL MEDICINE

## 2022-03-16 DIAGNOSIS — R16.1 SPLENOMEGALY: ICD-10-CM

## 2022-03-16 PROCEDURE — 76700 US EXAM ABDOM COMPLETE: CPT

## 2022-03-23 ENCOUNTER — LAB (OUTPATIENT)
Dept: OTHER | Facility: HOSPITAL | Age: 37
End: 2022-03-23

## 2022-03-23 ENCOUNTER — OFFICE VISIT (OUTPATIENT)
Dept: ONCOLOGY | Facility: CLINIC | Age: 37
End: 2022-03-23

## 2022-03-23 VITALS
RESPIRATION RATE: 16 BRPM | BODY MASS INDEX: 28.18 KG/M2 | OXYGEN SATURATION: 98 % | SYSTOLIC BLOOD PRESSURE: 137 MMHG | DIASTOLIC BLOOD PRESSURE: 88 MMHG | WEIGHT: 219.6 LBS | HEART RATE: 67 BPM | TEMPERATURE: 97.3 F | HEIGHT: 74 IN

## 2022-03-23 DIAGNOSIS — R16.1 SPLENOMEGALY: Primary | ICD-10-CM

## 2022-03-23 LAB
BASOPHILS # BLD AUTO: 0.03 10*3/MM3 (ref 0–0.2)
BASOPHILS NFR BLD AUTO: 0.5 % (ref 0–1.5)
DEPRECATED RDW RBC AUTO: 39.5 FL (ref 37–54)
EOSINOPHIL # BLD AUTO: 0.32 10*3/MM3 (ref 0–0.4)
EOSINOPHIL NFR BLD AUTO: 5.4 % (ref 0.3–6.2)
ERYTHROCYTE [DISTWIDTH] IN BLOOD BY AUTOMATED COUNT: 12.7 % (ref 12.3–15.4)
HCT VFR BLD AUTO: 46.5 % (ref 37.5–51)
HGB BLD-MCNC: 15.5 G/DL (ref 13–17.7)
IMM GRANULOCYTES # BLD AUTO: 0.02 10*3/MM3 (ref 0–0.05)
IMM GRANULOCYTES NFR BLD AUTO: 0.3 % (ref 0–0.5)
LYMPHOCYTES # BLD AUTO: 3.01 10*3/MM3 (ref 0.7–3.1)
LYMPHOCYTES NFR BLD AUTO: 50.8 % (ref 19.6–45.3)
MCH RBC QN AUTO: 28.4 PG (ref 26.6–33)
MCHC RBC AUTO-ENTMCNC: 33.3 G/DL (ref 31.5–35.7)
MCV RBC AUTO: 85.3 FL (ref 79–97)
MONOCYTES # BLD AUTO: 0.28 10*3/MM3 (ref 0.1–0.9)
MONOCYTES NFR BLD AUTO: 4.7 % (ref 5–12)
NEUTROPHILS NFR BLD AUTO: 2.26 10*3/MM3 (ref 1.7–7)
NEUTROPHILS NFR BLD AUTO: 38.3 % (ref 42.7–76)
NRBC BLD AUTO-RTO: 0 /100 WBC (ref 0–0.2)
PLATELET # BLD AUTO: 203 10*3/MM3 (ref 140–450)
PMV BLD AUTO: 9.5 FL (ref 6–12)
RBC # BLD AUTO: 5.45 10*6/MM3 (ref 4.14–5.8)
WBC NRBC COR # BLD: 5.92 10*3/MM3 (ref 3.4–10.8)

## 2022-03-23 PROCEDURE — 36415 COLL VENOUS BLD VENIPUNCTURE: CPT

## 2022-03-23 PROCEDURE — 99214 OFFICE O/P EST MOD 30 MIN: CPT | Performed by: INTERNAL MEDICINE

## 2022-03-23 PROCEDURE — 85025 COMPLETE CBC W/AUTO DIFF WBC: CPT | Performed by: INTERNAL MEDICINE

## 2022-03-23 RX ORDER — DESONIDE 0.5 MG/G
CREAM TOPICAL AS NEEDED
COMMUNITY

## 2022-03-23 RX ORDER — CHLORAL HYDRATE 500 MG
CAPSULE ORAL
COMMUNITY

## 2022-03-23 NOTE — PROGRESS NOTES
[facility]    CONSULTING IN BLOOD DISORDERS & CANCER      REASON FOR CONSULTATION/CHIEF COMPLAINT:     Evaluation & management for splenomegaly                             REQUESTING PHYSICIAN: No ref. provider found  RECORDS OBTAINED:  Records of the patients history including those from the electronic medical record were reviewed and summarized in detail.    HISTORY OF PRESENT ILLNESS:    The patient is a 36 y.o. year old male with no major past medical history had presented to his PCP with RUQ abdominal pain, heart burns & rapid weight loss in September 2021. A CMP from 9/10/21 revealed elevated serum creatinine of 1.33. An US abdomen from 9/21/21 showed borderline enlarged spleen measuring 12.9 cm. Liver was unremarkable, measuring 16.8 cm. This prompted referral to this clinic.   Patient reports he is an avid runner & may have ran more than usual prior to presentation to his PCP last month. Says his RUQ pain has resolved now. Repeat serum creatinine too has normalized. An EGD performed in end of September 2021 showed signs of block's esophagus. No other acute abnormalities. He has been started on PPI.     Patient reported of mainly reports of left lower abdominal pain. Denies any fever, chills, night sweats or any weight loss. No palpable lymphadenopathy. Denies tobacco, alcohol or drug abuse. No family hx blood or bone marrow disorder.    March 2022: Us abd with no splenomegaly or lymphadenopathy.    INTERIM HISTORY:  Pt returns to the clinic for a f/u visit. Patient denies any major complaints today. Has some musculoskeletal soreness after workout. Denies fever, chills, night sweats or palpable LNs.      Past Medical History:   Diagnosis Date   • Asthma    • Depression      Past Surgical History:   Procedure Laterality Date   • KNEE SURGERY         MEDICATIONS    Current Outpatient Medications:   •  [employer] Digestive Probiotic 250 MG capsule, Take 250 mg by mouth Daily., Disp: , Rfl:   •  desonide (DESOWEN)  "0.05 % cream, Apply  topically to the appropriate area as directed As Needed., Disp: , Rfl:   •  multivitamin (THERAGRAN) tablet tablet, Take 1 tablet by mouth Daily., Disp: , Rfl:   •  Omega-3 Fatty Acids (fish oil) 1000 MG capsule capsule, Take  by mouth., Disp: , Rfl:   •  omeprazole (priLOSEC) 40 MG capsule, Take 40 mg by mouth Daily., Disp: , Rfl:     ALLERGIES:   No Known Allergies    SOCIAL HISTORY:       Social History     Socioeconomic History   • Marital status:    Tobacco Use   • Smoking status: Former Smoker   • Smokeless tobacco: Never Used   Substance and Sexual Activity   • Alcohol use: Yes     Comment: occ   • Drug use: Never   • Sexual activity: Defer         FAMILY HISTORY:  Family History   Problem Relation Age of Onset   • No Known Problems Mother    • No Known Problems Father    • Asthma Sister    • Stroke Paternal Grandfather        REVIEW OF SYSTEMS:  Constitutional: [No fevers, chills, sweats]  Eye: [No recent visual problems]  ENMT: [No ear pain, nasal congestion, sore throat]  Respiratory: [No shortness of breath, cough]  Cardiovascular: [No Chest pain, palpitations, syncope]  Gastrointestinal: [No nausea, vomiting, diarrhea]  Genitourinary: [No hematuria]  Hema/Lymph: [Negative for bruising tendency, swollen lymph glands]  Endocrine: [Negative for excessive thirst, excessive hunger]  Musculoskeletal: [Denies any musculoskeletal pain or swelling]  Integumentary: [No rash, pruritus, abrasions]  Neurologic: [ No weakness or numbness, Alert & oriented X 4]         Vitals:    03/23/22 1225   BP: 137/88   Pulse: 67   Resp: 16   Temp: 97.3 °F (36.3 °C)   TempSrc: Temporal   SpO2: 98%   Weight: 99.6 kg (219 lb 9.6 oz)   Height: 188.4 cm (74.17\")   PainSc: 0-No pain     Current Status 3/23/2022   ECOG score 0      PHYSICAL EXAM:    CONSTITUTIONAL:  Vital signs reviewed.  No distress, looks comfortable.  EYES:  Conjunctiva and lids unremarkable.    EARS,NOSE,MOUTH,THROAT:  Ears and nose " appear unremarkable.  Lips, teeth, gums appear unremarkable.  RESPIRATORY:  Normal respiratory effort.  Lungs clear to auscultation bilaterally.  CARDIOVASCULAR:  Normal S1, S2.  No murmurs rubs or gallops.  No significant lower extremity edema.  GASTROINTESTINAL: Abdomen appears unremarkable.  Nondistended  LYMPHATIC:  No cervical, supraclavicular, axillary lymphadenopathy.  SKIN:  Warm.  No rashes.  PSYCHIATRIC:  Normal judgment and insight.  Normal mood and affect.     RECENT LABS:        Lab on 03/23/2022   Component Date Value Ref Range Status   • WBC 03/23/2022 5.92  3.40 - 10.80 10*3/mm3 Final   • RBC 03/23/2022 5.45  4.14 - 5.80 10*6/mm3 Final   • Hemoglobin 03/23/2022 15.5  13.0 - 17.7 g/dL Final   • Hematocrit 03/23/2022 46.5  37.5 - 51.0 % Final   • MCV 03/23/2022 85.3  79.0 - 97.0 fL Final   • MCH 03/23/2022 28.4  26.6 - 33.0 pg Final   • MCHC 03/23/2022 33.3  31.5 - 35.7 g/dL Final   • RDW 03/23/2022 12.7  12.3 - 15.4 % Final   • RDW-SD 03/23/2022 39.5  37.0 - 54.0 fl Final   • MPV 03/23/2022 9.5  6.0 - 12.0 fL Final   • Platelets 03/23/2022 203  140 - 450 10*3/mm3 Final   • Neutrophil % 03/23/2022 38.3 (A) 42.7 - 76.0 % Final   • Lymphocyte % 03/23/2022 50.8 (A) 19.6 - 45.3 % Final   • Monocyte % 03/23/2022 4.7 (A) 5.0 - 12.0 % Final   • Eosinophil % 03/23/2022 5.4  0.3 - 6.2 % Final   • Basophil % 03/23/2022 0.5  0.0 - 1.5 % Final   • Immature Grans % 03/23/2022 0.3  0.0 - 0.5 % Final   • Neutrophils, Absolute 03/23/2022 2.26  1.70 - 7.00 10*3/mm3 Final   • Lymphocytes, Absolute 03/23/2022 3.01  0.70 - 3.10 10*3/mm3 Final   • Monocytes, Absolute 03/23/2022 0.28  0.10 - 0.90 10*3/mm3 Final   • Eosinophils, Absolute 03/23/2022 0.32  0.00 - 0.40 10*3/mm3 Final   • Basophils, Absolute 03/23/2022 0.03  0.00 - 0.20 10*3/mm3 Final   • Immature Grans, Absolute 03/23/2022 0.02  0.00 - 0.05 10*3/mm3 Final   • nRBC 03/23/2022 0.0  0.0 - 0.2 /100 WBC Final         ASSESSMENT:   is a pleasant 35 y/o WM with  past medical history significant for anxiety/depression comes to this clinic for splenomegaly evaluation & management.     # Splenomegaly  · Patient had presented with RUQ abdominal pain, heart burn, rapid weight loss in early September 2021. Serum creatinine had was elevated at 1.33. US abdomen showed borderline enlarged splenomegaly.   · Discussed various causes for splenomegaly, including infectious (HIV, hepatitis, EBV etc), hemolytic disease/extramedullary hematopoiesis, infiltrative (sarcoidosis, gaucher's, SLE etc), thrombosis and hematologic malignancies.   · Patient does not have any acute abdominal pain, fever, chills, night sweats or weight loss at this time. In fact, the only symptom he reports of today is mild left lower abdominal pain. He does not have any risk factors for HIV or hepatitis infections. LFTs within normal limits. Hemoglobin stable.   · Low suspicion for lymphoma or any other heme malignancy. Suspect borderline enlarged splenomegaly could have been related to reactive finding.  · A CT a/p performed 10/5/21 showed borderline increased spleen size of 12.4 cm. No lymphadenopathy or any other acute abnormalities. Informed patient that his increased spleen size is likely a normal variation due to based on gender and weight/height. Taller, heavier & men usually have bigger spleen size. Patient does not have any signs/symptoms of lymphoproliferative diseases.   · Discussed plan to monitor for now. A repeat US abd from March 2022 too shows normal spleen size. Discussed plan to monitor for now. Patient was advised to call if he has any new/persistent symptoms.     # Lymphocytosis:  Pt was noted to have increased lymphocyte percentage on the differential, although normal WBC & absolute lymphocyte count.  Suspect reactive. Will repeat it in a month. If persistent, will send flow cytometry.     PLAN:   - Monitor for splenomegaly.  - repeat CBC in a month with RN review.   - F/u in a year or sooner if  needed    Orders Placed This Encounter   Procedures   • CBC & Differential     Standing Status:   Future     Standing Expiration Date:   3/23/2023     Order Specific Question:   Manual Differential     Answer:   No

## 2022-04-20 ENCOUNTER — APPOINTMENT (OUTPATIENT)
Dept: OTHER | Facility: HOSPITAL | Age: 37
End: 2022-04-20

## 2022-05-04 ENCOUNTER — LAB (OUTPATIENT)
Dept: OTHER | Facility: HOSPITAL | Age: 37
End: 2022-05-04

## 2022-05-04 DIAGNOSIS — R16.1 SPLENOMEGALY: ICD-10-CM

## 2022-05-04 LAB
BASOPHILS # BLD AUTO: 0.02 10*3/MM3 (ref 0–0.2)
BASOPHILS NFR BLD AUTO: 0.3 % (ref 0–1.5)
DEPRECATED RDW RBC AUTO: 39.7 FL (ref 37–54)
EOSINOPHIL # BLD AUTO: 0.19 10*3/MM3 (ref 0–0.4)
EOSINOPHIL NFR BLD AUTO: 3.2 % (ref 0.3–6.2)
ERYTHROCYTE [DISTWIDTH] IN BLOOD BY AUTOMATED COUNT: 12.7 % (ref 12.3–15.4)
HCT VFR BLD AUTO: 44.4 % (ref 37.5–51)
HGB BLD-MCNC: 14.8 G/DL (ref 13–17.7)
IMM GRANULOCYTES # BLD AUTO: 0.06 10*3/MM3 (ref 0–0.05)
IMM GRANULOCYTES NFR BLD AUTO: 1 % (ref 0–0.5)
LYMPHOCYTES # BLD AUTO: 2.33 10*3/MM3 (ref 0.7–3.1)
LYMPHOCYTES NFR BLD AUTO: 39.1 % (ref 19.6–45.3)
MCH RBC QN AUTO: 28.6 PG (ref 26.6–33)
MCHC RBC AUTO-ENTMCNC: 33.3 G/DL (ref 31.5–35.7)
MCV RBC AUTO: 85.7 FL (ref 79–97)
MONOCYTES # BLD AUTO: 0.34 10*3/MM3 (ref 0.1–0.9)
MONOCYTES NFR BLD AUTO: 5.7 % (ref 5–12)
NEUTROPHILS NFR BLD AUTO: 3.02 10*3/MM3 (ref 1.7–7)
NEUTROPHILS NFR BLD AUTO: 50.7 % (ref 42.7–76)
NRBC BLD AUTO-RTO: 0 /100 WBC (ref 0–0.2)
PLATELET # BLD AUTO: 177 10*3/MM3 (ref 140–450)
PMV BLD AUTO: 10 FL (ref 6–12)
RBC # BLD AUTO: 5.18 10*6/MM3 (ref 4.14–5.8)
WBC NRBC COR # BLD: 5.96 10*3/MM3 (ref 3.4–10.8)

## 2022-05-04 PROCEDURE — 85025 COMPLETE CBC W/AUTO DIFF WBC: CPT | Performed by: INTERNAL MEDICINE

## 2022-05-04 PROCEDURE — 36415 COLL VENOUS BLD VENIPUNCTURE: CPT

## 2022-05-05 ENCOUNTER — APPOINTMENT (OUTPATIENT)
Dept: OTHER | Facility: HOSPITAL | Age: 37
End: 2022-05-05

## 2022-05-16 ENCOUNTER — TELEPHONE (OUTPATIENT)
Dept: ONCOLOGY | Facility: CLINIC | Age: 37
End: 2022-05-16

## 2022-05-16 NOTE — TELEPHONE ENCOUNTER
Caller: COURTNEY    Relationship to patient: SELF    Best call back number: [phone]    Patient is needing: LAB RESULTS FROM 5-4-2022.

## 2022-05-16 NOTE — TELEPHONE ENCOUNTER
Returned call to patient to let him know that his CBC was within normal range except for his immature granulocytes were 1.00.  He wants to know if there is anything that he should do regarding this abnormal finding.  I explained that the level is ok, but he would like Dr. Mast to review.

## 2022-05-16 NOTE — TELEPHONE ENCOUNTER
"Returned call to patient with the following information from Dr. Mast:  \"I reviewed his CBC.  It looks normal to me.  Mild increase in immature granulocytes likely reactive/inflammation related. I would advise him to maintain follow-up with his PCP and maybe get another CBC in 4 to 6 months time.  I will see him back in a year as scheduled\".  Patient v/u.  He will have the repeat CBC at his PCP office.  "

## 2022-10-12 ENCOUNTER — HOSPITAL ENCOUNTER (EMERGENCY)
Facility: HOSPITAL | Age: 37
Discharge: HOME | End: 2022-10-12
Attending: EMERGENCY MEDICINE | Admitting: EMERGENCY MEDICINE

## 2022-10-12 ENCOUNTER — APPOINTMENT (OUTPATIENT)
Dept: GENERAL RADIOLOGY | Facility: HOSPITAL | Age: 37
End: 2022-10-12

## 2022-10-12 VITALS
TEMPERATURE: 99.1 F | DIASTOLIC BLOOD PRESSURE: 82 MMHG | HEIGHT: 74 IN | RESPIRATION RATE: 18 BRPM | HEART RATE: 74 BPM | WEIGHT: 220 LBS | OXYGEN SATURATION: 97 % | BODY MASS INDEX: 28.23 KG/M2 | SYSTOLIC BLOOD PRESSURE: 126 MMHG

## 2022-10-12 DIAGNOSIS — I49.8 BIGEMINY: Primary | ICD-10-CM

## 2022-10-12 DIAGNOSIS — R00.2 PALPITATIONS: ICD-10-CM

## 2022-10-12 LAB
ALBUMIN SERPL-MCNC: 4.8 G/DL (ref 3.5–5.2)
ALBUMIN/GLOB SERPL: 1.6 G/DL
ALP SERPL-CCNC: 69 U/L (ref 39–117)
ALT SERPL W P-5'-P-CCNC: 16 U/L (ref 1–41)
ANION GAP SERPL CALCULATED.3IONS-SCNC: 7.9 MMOL/L (ref 5–15)
AST SERPL-CCNC: 18 U/L (ref 1–40)
BASOPHILS # BLD AUTO: 0.03 10*3/MM3 (ref 0–0.2)
BASOPHILS NFR BLD AUTO: 0.3 % (ref 0–1.5)
BILIRUB SERPL-MCNC: 0.5 MG/DL (ref 0–1.2)
BUN SERPL-MCNC: 13 MG/DL (ref 6–20)
BUN/CREAT SERPL: 11.7 (ref 7–25)
CALCIUM SPEC-SCNC: 9.7 MG/DL (ref 8.6–10.5)
CHLORIDE SERPL-SCNC: 101 MMOL/L (ref 98–107)
CO2 SERPL-SCNC: 28.1 MMOL/L (ref 22–29)
CREAT SERPL-MCNC: 1.11 MG/DL (ref 0.76–1.27)
D DIMER PPP FEU-MCNC: <0.27 MCGFEU/ML (ref 0–0.46)
DEPRECATED RDW RBC AUTO: 41.4 FL (ref 37–54)
EGFRCR SERPLBLD CKD-EPI 2021: 87.7 ML/MIN/1.73
EOSINOPHIL # BLD AUTO: 0.4 10*3/MM3 (ref 0–0.4)
EOSINOPHIL NFR BLD AUTO: 4.4 % (ref 0.3–6.2)
ERYTHROCYTE [DISTWIDTH] IN BLOOD BY AUTOMATED COUNT: 12.9 % (ref 12.3–15.4)
FLUAV RNA RESP QL NAA+PROBE: NOT DETECTED
FLUBV RNA RESP QL NAA+PROBE: NOT DETECTED
GLOBULIN UR ELPH-MCNC: 3 GM/DL
GLUCOSE SERPL-MCNC: 89 MG/DL (ref 65–99)
HCT VFR BLD AUTO: 47.1 % (ref 37.5–51)
HGB BLD-MCNC: 15.6 G/DL (ref 13–17.7)
HOLD SPECIMEN: NORMAL
HOLD SPECIMEN: NORMAL
IMM GRANULOCYTES # BLD AUTO: 0.03 10*3/MM3 (ref 0–0.05)
IMM GRANULOCYTES NFR BLD AUTO: 0.3 % (ref 0–0.5)
LYMPHOCYTES # BLD AUTO: 3.85 10*3/MM3 (ref 0.7–3.1)
LYMPHOCYTES NFR BLD AUTO: 42.2 % (ref 19.6–45.3)
MCH RBC QN AUTO: 29.1 PG (ref 26.6–33)
MCHC RBC AUTO-ENTMCNC: 33.1 G/DL (ref 31.5–35.7)
MCV RBC AUTO: 87.9 FL (ref 79–97)
MONOCYTES # BLD AUTO: 0.42 10*3/MM3 (ref 0.1–0.9)
MONOCYTES NFR BLD AUTO: 4.6 % (ref 5–12)
NEUTROPHILS NFR BLD AUTO: 4.4 10*3/MM3 (ref 1.7–7)
NEUTROPHILS NFR BLD AUTO: 48.2 % (ref 42.7–76)
NRBC BLD AUTO-RTO: 0 /100 WBC (ref 0–0.2)
PLATELET # BLD AUTO: 225 10*3/MM3 (ref 140–450)
PMV BLD AUTO: 9.2 FL (ref 6–12)
POTASSIUM SERPL-SCNC: 4.2 MMOL/L (ref 3.5–5.2)
PROT SERPL-MCNC: 7.8 G/DL (ref 6–8.5)
RBC # BLD AUTO: 5.36 10*6/MM3 (ref 4.14–5.8)
SARS-COV-2 RNA RESP QL NAA+PROBE: NOT DETECTED
SODIUM SERPL-SCNC: 137 MMOL/L (ref 136–145)
TROPONIN T SERPL-MCNC: <0.01 NG/ML (ref 0–0.03)
TROPONIN T SERPL-MCNC: <0.01 NG/ML (ref 0–0.03)
TSH SERPL DL<=0.05 MIU/L-ACNC: 1.58 UIU/ML (ref 0.27–4.2)
WBC NRBC COR # BLD: 9.13 10*3/MM3 (ref 3.4–10.8)
WHOLE BLOOD HOLD COAG: NORMAL
WHOLE BLOOD HOLD SPECIMEN: NORMAL

## 2022-10-12 PROCEDURE — 87636 SARSCOV2 & INF A&B AMP PRB: CPT | Performed by: EMERGENCY MEDICINE

## 2022-10-12 PROCEDURE — 36415 COLL VENOUS BLD VENIPUNCTURE: CPT

## 2022-10-12 PROCEDURE — 84484 ASSAY OF TROPONIN QUANT: CPT | Performed by: EMERGENCY MEDICINE

## 2022-10-12 PROCEDURE — 85379 FIBRIN DEGRADATION QUANT: CPT | Performed by: EMERGENCY MEDICINE

## 2022-10-12 PROCEDURE — 93010 ELECTROCARDIOGRAM REPORT: CPT | Performed by: INTERNAL MEDICINE

## 2022-10-12 PROCEDURE — 99284 EMERGENCY DEPT VISIT MOD MDM: CPT

## 2022-10-12 PROCEDURE — 71045 X-RAY EXAM CHEST 1 VIEW: CPT

## 2022-10-12 PROCEDURE — 80050 GENERAL HEALTH PANEL: CPT | Performed by: EMERGENCY MEDICINE

## 2022-10-12 PROCEDURE — 93005 ELECTROCARDIOGRAM TRACING: CPT | Performed by: EMERGENCY MEDICINE

## 2022-10-12 RX ORDER — SODIUM CHLORIDE 0.9 % (FLUSH) 0.9 %
10 SYRINGE (ML) INJECTION AS NEEDED
Status: DISCONTINUED | OUTPATIENT
Start: 2022-10-12 | End: 2022-10-13 | Stop reason: HOSPADM

## 2022-10-12 RX ORDER — ASPIRIN 81 MG/1
324 TABLET, CHEWABLE ORAL ONCE
Status: COMPLETED | OUTPATIENT
Start: 2022-10-12 | End: 2022-10-12

## 2022-10-12 RX ADMIN — ASPIRIN 81 MG CHEWABLE TABLET 324 MG: 81 TABLET CHEWABLE at 18:52

## 2022-10-12 NOTE — ED PROVIDER NOTES
Subjective   History of Present Illness  37-year-old male with no significant past medical history presents emergency room complaining of 2 to 3 days of palpitations and mild wheezing.  Patient states that 2 weeks ago he was treated for a sinus infection with Levaquin which he completed and had improvement.  Approximately 1 week ago his wife and child were diagnosed with COVID but he has not tested positive for COVID but he has felt general malaise and the above symptoms the last few days.  Patient states that he ran several miles yesterday and felt better while he was running and stated that he felt like he could keep running but but only went the allotted time he had and then over the next several hours gradually felt worse and worse like he had felt previous to his run.  Patient states that he feels his heart pounding at times and that it skips a beat at times.  He states this has not happened before.  Patient states he drinks alcohol socially only 2-3 drinks a week.  Family history of coronary artery disease is negative.        Review of Systems   Constitutional: Positive for fatigue. Negative for activity change, appetite change, chills, diaphoresis and fever.   HENT: Negative for congestion, sinus pressure, sneezing and sore throat.    Eyes: Negative for photophobia and visual disturbance.   Respiratory: Positive for shortness of breath. Negative for cough.    Cardiovascular: Positive for palpitations. Negative for chest pain and leg swelling.   Gastrointestinal: Negative for abdominal distention, abdominal pain, diarrhea, nausea and vomiting.   Genitourinary: Negative for dysuria and flank pain.   Musculoskeletal: Negative for arthralgias, back pain and myalgias.   Skin: Negative for rash.   Neurological: Negative for dizziness, weakness and headaches.   Psychiatric/Behavioral: Negative for behavioral problems and confusion.       Past Medical History:   Diagnosis Date   • Asthma    • Depression        No  Known Allergies    Past Surgical History:   Procedure Laterality Date   • KNEE SURGERY         Family History   Problem Relation Age of Onset   • No Known Problems Mother    • No Known Problems Father    • Asthma Sister    • Stroke Paternal Grandfather        Social History     Socioeconomic History   • Marital status:    Tobacco Use   • Smoking status: Former   • Smokeless tobacco: Never   Substance and Sexual Activity   • Alcohol use: Yes     Comment: occ   • Drug use: Never   • Sexual activity: Defer           Objective   Physical Exam  Constitutional:       General: He is not in acute distress.     Appearance: Normal appearance. He is not toxic-appearing or diaphoretic.   HENT:      Head: Normocephalic and atraumatic.      Mouth/Throat:      Mouth: Mucous membranes are moist.   Eyes:      Conjunctiva/sclera: Conjunctivae normal.   Cardiovascular:      Rate and Rhythm: Normal rate and regular rhythm.      Pulses: Normal pulses.      Comments: Auscultation was normal sinus rhythm however during exam monitor was noted to show occasional PVCs with runs of bigeminy and one noticed run of V. tach for approximately 5-10 beats.  Pulmonary:      Effort: Pulmonary effort is normal. No respiratory distress.      Breath sounds: Normal breath sounds. No wheezing or rales.   Abdominal:      General: Abdomen is flat. There is no distension.      Tenderness: There is no abdominal tenderness.   Musculoskeletal:         General: No swelling or signs of injury. Normal range of motion.      Cervical back: Normal range of motion and neck supple.   Skin:     General: Skin is warm and dry.      Findings: No rash.   Neurological:      General: No focal deficit present.      Mental Status: He is alert and oriented to person, place, and time.   Psychiatric:         Mood and Affect: Mood normal.         Behavior: Behavior normal.         Procedures           ED Course  ED Course as of 10/12/22 2149   Wed Oct 12, 2022   2144  Troponin  Spoke with Dr. Peña cardiology concerning patient's history lab and radiological findings lung EKG findings.  Dr. Peña agrees with Zio patch placement in the a.m. and follow-up with his office.    I discussed lab related radiological findings is along with EKG and also discussion had with Dr. Peña with patient and patient's mother.  They state they understand and agree with plan of Zio patch placement in the a.m. and follow-up Dr. Peña office outpatient only.  Instructed patient to return to the emergency room if symptoms worsen or having chest pain shortness of breath diaphoresis jaw pain or any other concerning symptoms.  Patient states he understands and agrees with plan [BH]      ED Course User Index  [BH] Herrera Pringle MD                                           MDM  Number of Diagnoses or Management Options     Amount and/or Complexity of Data Reviewed  Clinical lab tests: ordered and reviewed  Tests in the radiology section of CPT®: ordered and reviewed  Tests in the medicine section of CPT®: ordered and reviewed    Risk of Complications, Morbidity, and/or Mortality  Presenting problems: moderate  Diagnostic procedures: moderate  Management options: moderate        Final diagnoses:   Bigeminy   Palpitations       ED Disposition  ED Disposition     ED Disposition   Discharge    Condition   Stable    Comment   --               Please call [phone] tomorrow morning at 8 AM to arrange placement of Zio patch.  Go to   As needed    [facility] Emergency Department  [address] [address] [phone]        Peter Peña III, MD  [address] [id number] [phone]    Schedule an appointment as soon as possible for a visit            Medication List      No changes were made to your prescriptions during this visit.          Herrera Pringle MD  10/12/22 1055

## 2022-10-13 DIAGNOSIS — R00.2 PALPITATIONS: Primary | ICD-10-CM

## 2022-10-13 LAB — QT INTERVAL: 363 MS

## 2022-10-14 ENCOUNTER — HOSPITAL ENCOUNTER (OUTPATIENT)
Dept: CARDIOLOGY | Facility: HOSPITAL | Age: 37
Discharge: HOME | End: 2022-10-14
Admitting: INTERNAL MEDICINE

## 2022-10-14 DIAGNOSIS — R00.2 PALPITATIONS: ICD-10-CM

## 2022-10-14 PROCEDURE — 93226 XTRNL ECG REC<48 HR SCAN A/R: CPT

## 2022-10-14 PROCEDURE — 93225 XTRNL ECG REC<48 HRS REC: CPT

## 2022-10-18 ENCOUNTER — TELEPHONE (OUTPATIENT)
Dept: CARDIOLOGY | Facility: CLINIC | Age: 37
End: 2022-10-18

## 2022-10-18 DIAGNOSIS — I47.29 NSVT (NONSUSTAINED VENTRICULAR TACHYCARDIA): Primary | ICD-10-CM

## 2022-10-18 LAB
MAXIMAL PREDICTED HEART RATE: 183 BPM
STRESS TARGET HR: 156 BPM

## 2022-10-18 PROCEDURE — 93227 XTRNL ECG REC<48 HR R&I: CPT | Performed by: INTERNAL MEDICINE

## 2022-10-26 ENCOUNTER — HOSPITAL ENCOUNTER (OUTPATIENT)
Dept: CARDIOLOGY | Facility: HOSPITAL | Age: 37
Discharge: HOME | End: 2022-10-26

## 2022-10-26 VITALS
HEIGHT: 74 IN | WEIGHT: 220 LBS | BODY MASS INDEX: 28.23 KG/M2 | SYSTOLIC BLOOD PRESSURE: 137 MMHG | DIASTOLIC BLOOD PRESSURE: 88 MMHG

## 2022-10-26 DIAGNOSIS — I47.29 NSVT (NONSUSTAINED VENTRICULAR TACHYCARDIA): ICD-10-CM

## 2022-10-26 LAB
AORTIC DIMENSIONLESS INDEX: 0.8 (DI)
BH CV ECHO MEAS - ACS: 1.64 CM
BH CV ECHO MEAS - AO MAX PG: 6.6 MMHG
BH CV ECHO MEAS - AO MEAN PG: 4 MMHG
BH CV ECHO MEAS - AO ROOT DIAM: 2.8 CM
BH CV ECHO MEAS - AO V2 MAX: 128 CM/SEC
BH CV ECHO MEAS - AO V2 VTI: 28 CM
BH CV ECHO MEAS - AVA(I,D): 2.7 CM2
BH CV ECHO MEAS - EDV(CUBED): 140.6 ML
BH CV ECHO MEAS - EDV(MOD-SP2): 124 ML
BH CV ECHO MEAS - EDV(MOD-SP4): 101 ML
BH CV ECHO MEAS - EF(MOD-BP): 63.6 %
BH CV ECHO MEAS - EF(MOD-SP2): 60.5 %
BH CV ECHO MEAS - EF(MOD-SP4): 66.3 %
BH CV ECHO MEAS - ESV(CUBED): 39.2 ML
BH CV ECHO MEAS - ESV(MOD-SP2): 49 ML
BH CV ECHO MEAS - ESV(MOD-SP4): 34 ML
BH CV ECHO MEAS - FS: 34.7 %
BH CV ECHO MEAS - IVS/LVPW: 1.13 CM
BH CV ECHO MEAS - IVSD: 0.9 CM
BH CV ECHO MEAS - LAT PEAK E' VEL: 17.2 CM/SEC
BH CV ECHO MEAS - LV DIASTOLIC VOL/BSA (35-75): 44.6 CM2
BH CV ECHO MEAS - LV MASS(C)D: 156.9 GRAMS
BH CV ECHO MEAS - LV MAX PG: 5.1 MMHG
BH CV ECHO MEAS - LV MEAN PG: 2 MMHG
BH CV ECHO MEAS - LV SYSTOLIC VOL/BSA (12-30): 15 CM2
BH CV ECHO MEAS - LV V1 MAX: 113 CM/SEC
BH CV ECHO MEAS - LV V1 VTI: 21.7 CM
BH CV ECHO MEAS - LVIDD: 5.2 CM
BH CV ECHO MEAS - LVIDS: 3.4 CM
BH CV ECHO MEAS - LVOT AREA: 3.5 CM2
BH CV ECHO MEAS - LVOT DIAM: 2.1 CM
BH CV ECHO MEAS - LVPWD: 0.8 CM
BH CV ECHO MEAS - MED PEAK E' VEL: 12.1 CM/SEC
BH CV ECHO MEAS - MV A MAX VEL: 76.5 CM/SEC
BH CV ECHO MEAS - MV DEC SLOPE: 225.6 CM/SEC2
BH CV ECHO MEAS - MV DEC TIME: 0.25 MSEC
BH CV ECHO MEAS - MV E MAX VEL: 71.3 CM/SEC
BH CV ECHO MEAS - MV E/A: 0.93
BH CV ECHO MEAS - MV MAX PG: 3.1 MMHG
BH CV ECHO MEAS - MV MEAN PG: 1.43 MMHG
BH CV ECHO MEAS - MV P1/2T: 118.7 MSEC
BH CV ECHO MEAS - MV V2 VTI: 31.6 CM
BH CV ECHO MEAS - MVA(P1/2T): 1.85 CM2
BH CV ECHO MEAS - MVA(VTI): 2.38 CM2
BH CV ECHO MEAS - PA V2 MAX: 113.9 CM/SEC
BH CV ECHO MEAS - QP/QS: 0.65
BH CV ECHO MEAS - RAP SYSTOLE: 3 MMHG
BH CV ECHO MEAS - RV MAX PG: 2.7 MMHG
BH CV ECHO MEAS - RV V1 MAX: 82.8 CM/SEC
BH CV ECHO MEAS - RV V1 VTI: 17.6 CM
BH CV ECHO MEAS - RVOT DIAM: 1.87 CM
BH CV ECHO MEAS - SI(MOD-SP2): 33.1 ML/M2
BH CV ECHO MEAS - SI(MOD-SP4): 29.6 ML/M2
BH CV ECHO MEAS - SV(LVOT): 75.2 ML
BH CV ECHO MEAS - SV(MOD-SP2): 75 ML
BH CV ECHO MEAS - SV(MOD-SP4): 67 ML
BH CV ECHO MEAS - SV(RVOT): 48.5 ML
BH CV ECHO MEASUREMENTS AVERAGE E/E' RATIO: 4.87
BH CV STRESS BP STAGE 1: NORMAL
BH CV STRESS BP STAGE 2: NORMAL
BH CV STRESS BP STAGE 3: NORMAL
BH CV STRESS BP STAGE 4: NORMAL
BH CV STRESS DURATION MIN STAGE 1: 3
BH CV STRESS DURATION MIN STAGE 2: 3
BH CV STRESS DURATION MIN STAGE 3: 3
BH CV STRESS DURATION MIN STAGE 4: 1
BH CV STRESS DURATION SEC STAGE 1: 0
BH CV STRESS DURATION SEC STAGE 2: 0
BH CV STRESS DURATION SEC STAGE 3: 0
BH CV STRESS DURATION SEC STAGE 4: 9
BH CV STRESS GRADE STAGE 1: 10
BH CV STRESS GRADE STAGE 2: 12
BH CV STRESS GRADE STAGE 3: 14
BH CV STRESS GRADE STAGE 4: 16
BH CV STRESS HR STAGE 1: 83
BH CV STRESS HR STAGE 2: 102
BH CV STRESS HR STAGE 3: 136
BH CV STRESS HR STAGE 4: 158
BH CV STRESS METS STAGE 1: 4.6
BH CV STRESS METS STAGE 2: 7.1
BH CV STRESS METS STAGE 3: 10.2
BH CV STRESS METS STAGE 4: 12.1
BH CV STRESS PROTOCOL 1: NORMAL
BH CV STRESS RECOVERY BP: NORMAL MMHG
BH CV STRESS RECOVERY HR: 98 BPM
BH CV STRESS SPEED STAGE 1: 1.7
BH CV STRESS SPEED STAGE 2: 2.5
BH CV STRESS SPEED STAGE 3: 3.4
BH CV STRESS SPEED STAGE 4: 4.2
BH CV STRESS STAGE 1: 1
BH CV STRESS STAGE 2: 2
BH CV STRESS STAGE 3: 3
BH CV STRESS STAGE 4: 4
BH CV XLRA - RV BASE: 3.9 CM
BH CV XLRA - RV LENGTH: 7.2 CM
BH CV XLRA - RV MID: 3.6 CM
BH CV XLRA - TDI S': 13.8 CM/SEC
LEFT ATRIUM VOLUME INDEX: 20.2 ML/M2
MAXIMAL PREDICTED HEART RATE: 183 BPM
MAXIMAL PREDICTED HEART RATE: 183 BPM
PERCENT MAX PREDICTED HR: 87.43 %
SINUS: 3.2 CM
STJ: 2.8 CM
STRESS BASELINE BP: NORMAL MMHG
STRESS BASELINE HR: 73 BPM
STRESS O2 SAT REST: 99 %
STRESS PERCENT HR: 103 %
STRESS POST ESTIMATED WORKLOAD: 12.1 METS
STRESS POST EXERCISE DUR MIN: 10 MIN
STRESS POST EXERCISE DUR SEC: 10 SEC
STRESS POST O2 SAT PEAK: 99 %
STRESS POST PEAK BP: NORMAL MMHG
STRESS POST PEAK HR: 160 BPM
STRESS TARGET HR: 156 BPM
STRESS TARGET HR: 156 BPM

## 2022-10-26 PROCEDURE — 93016 CV STRESS TEST SUPVJ ONLY: CPT | Performed by: INTERNAL MEDICINE

## 2022-10-26 PROCEDURE — 93306 TTE W/DOPPLER COMPLETE: CPT

## 2022-10-26 PROCEDURE — 93017 CV STRESS TEST TRACING ONLY: CPT

## 2022-10-26 PROCEDURE — 93306 TTE W/DOPPLER COMPLETE: CPT | Performed by: INTERNAL MEDICINE

## 2022-10-26 PROCEDURE — 93018 CV STRESS TEST I&R ONLY: CPT | Performed by: INTERNAL MEDICINE

## 2022-10-27 ENCOUNTER — TELEPHONE (OUTPATIENT)
Dept: CARDIOLOGY | Facility: CLINIC | Age: 37
End: 2022-10-27

## 2022-10-27 ENCOUNTER — OFFICE VISIT (OUTPATIENT)
Dept: CARDIOLOGY | Facility: CLINIC | Age: 37
End: 2022-10-27

## 2022-10-27 VITALS
DIASTOLIC BLOOD PRESSURE: 82 MMHG | HEART RATE: 72 BPM | BODY MASS INDEX: 28.62 KG/M2 | OXYGEN SATURATION: 97 % | SYSTOLIC BLOOD PRESSURE: 128 MMHG | HEIGHT: 74 IN | WEIGHT: 223 LBS

## 2022-10-27 DIAGNOSIS — I47.29 NSVT (NONSUSTAINED VENTRICULAR TACHYCARDIA): Primary | ICD-10-CM

## 2022-10-27 PROCEDURE — 99204 OFFICE O/P NEW MOD 45 MIN: CPT | Performed by: INTERNAL MEDICINE

## 2022-10-27 NOTE — TELEPHONE ENCOUNTER
Spoke to pt regatding his ECHO and stress test results, he verbalized understanding     ----- Message from Peter Peña III, MD sent at 10/26/2022  4:03 PM EDT -----  Please call- normal results

## 2022-10-27 NOTE — PROGRESS NOTES
Subjective:     Encounter Date:10/27/22      Patient ID: Lance Healy is a 37 y.o. male.    Chief Complaint:  History of Present Illness    Dear Dr. Singer,    I had the pleasure of seeing this patient in the office today for initial evaluation and consultation.  I appreciate that you sent him in to see us.  They come in today to be seen for follow-up from the emergency room.    Patient presented emergency room with complaint of palpitations.  He had COVID 2 months prior, and the time when he presented on October 12 to the emergency room his wife and their son had COVID.  He was feeling ill but he tested for COVID and was negative.  He was noted to have palpitations.  In the emergency room evaluation was unremarkable other than frequent PVCs were seen on telemetry and EKG, they placed Holter monitor.  Holter monitor showed recurrent runs of nonsustained ventricular tachycardia.  He was actually scheduled to see me the next morning, but instead I set him up first for an exercise treadmill test as well as an echocardiogram.  These were reviewed in detail below, but on the stress treadmill test he had no ectopy at any time, specifically no exercise-induced ectopy, and the echocardiogram showed normal biventricular size and function with normal valvular structure and function.    Patient states that he still is having some symptoms lingering from his infection.  He did check again in the still tested negative for COVID.  Has been having a lot of fatigue, some tiredness, please really not feeling palpitations anymore.  However he notes that on the Holter monitor we saw 6 discrete episodes of nonsustained VT and yet he only felt a brief instance of something he wants.    No associated symptoms.  No dizziness or lightheadedness, no presyncope or syncope.  No family history of congenital heart defects or arrhythmias.    He wrestled as a sport and high school, his largely worked out and remained physically fit but he is  "not significantly athletic at this point.  No competitive sports.    The following portions of the patient's history were reviewed and updated as appropriate: allergies, current medications, past family history, past medical history, past social history, past surgical history and problem list.    Procedures       Objective:     Vitals:    10/27/22 1454   BP: 128/82   Pulse: 72   SpO2: 97%   Weight: 101 kg (223 lb)   Height: 188 cm (74\")     Body mass index is 28.63 kg/m².      Vitals reviewed.   Constitutional:       General: Not in acute distress.     Appearance: Well-developed. Not diaphoretic.   Eyes:      General:         Right eye: No discharge.         Left eye: No discharge.      Conjunctiva/sclera: Conjunctivae normal.      Pupils: Pupils are equal, round, and reactive to light.   HENT:      Head: Normocephalic and atraumatic.      Nose: Nose normal.   Neck:      Thyroid: No thyromegaly.      Trachea: No tracheal deviation.      Lymphadenopathy: No cervical adenopathy.   Pulmonary:      Effort: Pulmonary effort is normal. No respiratory distress.      Breath sounds: Normal breath sounds. No stridor.   Chest:      Chest wall: Not tender to palpatation.   Cardiovascular:      Normal rate. Regular rhythm.      Murmurs: There is no murmur.      . No S3 gallop. No click. No rub.   Pulses:     Intact distal pulses.   Edema:     Peripheral edema absent.   Abdominal:      General: Bowel sounds are normal. There is no distension.      Palpations: Abdomen is soft. There is no abdominal mass.      Tenderness: There is no abdominal tenderness. There is no guarding or rebound.   Musculoskeletal: Normal range of motion.         General: No tenderness or deformity.      Cervical back: Normal range of motion and neck supple. Skin:     General: Skin is warm and dry.      Findings: No erythema or rash.   Neurological:      Mental Status: Alert and oriented to person, place, and time.      Deep Tendon Reflexes: Reflexes are " normal and symmetric.   Psychiatric:         Thought Content: Thought content normal.         Data and records reviewed:     Lab Results   Component Value Date    GLUCOSE 89 10/12/2022    BUN 13 10/12/2022    CREATININE 1.11 10/12/2022    EGFRIFNONA 67 08/13/2021    BCR 11.7 10/12/2022    K 4.2 10/12/2022    CO2 28.1 10/12/2022    CALCIUM 9.7 10/12/2022    ALBUMIN 4.80 10/12/2022    AST 18 10/12/2022    ALT 16 10/12/2022     No results found for: CHOL  No results found for: TRIG  No results found for: HDL  No results found for: LDL  No results found for: VLDL  No results found for: LDLHDL  CBC    CBC 3/23/22 5/4/22 10/12/22   WBC 5.92 5.96 9.13   RBC 5.45 5.18 5.36   Hemoglobin 15.5 14.8 15.6   Hematocrit 46.5 44.4 47.1   MCV 85.3 85.7 87.9   MCH 28.4 28.6 29.1   MCHC 33.3 33.3 33.1   RDW 12.7 12.7 12.9   Platelets 203 177 225           XR Chest 1 View    Result Date: 10/12/2022  No acute cardiopulmonary findings. Signer Name: Terrence Whipple MD  Signed: 10/12/2022 7:18 PM  Workstation Name: [id number]  [facility]    Results for orders placed during the hospital encounter of 10/26/22    Adult Transthoracic Echo Complete W/ Cont if Necessary Per Protocol    Interpretation Summary  •  Left ventricular systolic function is normal. Calculated left ventricular EF = 63.6%  •  Left ventricular diastolic function was normal.  •  Estimated right ventricular systolic pressure from tricuspid regurgitation is normal (<35 mmHg).          Assessment:          Diagnosis Plan   1. NSVT (nonsustained ventricular tachycardia)  Holter Monitor - 24 Hour             Plan:       1.  Nonsustained ventricular tachycardia, patient reports substantial improvement since 20 initially was feeling symptoms earlier in the month, we are going to place another Holter monitor.  Also review his case with Dr. Jean of our EP clinic.    Thank you very much for allowing us to participate in the care of this pleasant patient.  Please  don't hesitate to call if I can be of assistance in any way.      Current Outpatient Medications:   •  [employer] Digestive Probiotic 250 MG capsule, Take 250 mg by mouth Daily., Disp: , Rfl:   •  desonide (DESOWEN) 0.05 % cream, Apply  topically to the appropriate area as directed As Needed., Disp: , Rfl:   •  multivitamin (THERAGRAN) tablet tablet, Take 1 tablet by mouth Daily., Disp: , Rfl:   •  Omega-3 Fatty Acids (fish oil) 1000 MG capsule capsule, Take  by mouth., Disp: , Rfl:   •  omeprazole (priLOSEC) 40 MG capsule, Take 40 mg by mouth Daily., Disp: , Rfl:          No follow-ups on file.

## 2022-11-02 ENCOUNTER — HOSPITAL ENCOUNTER (OUTPATIENT)
Dept: CARDIOLOGY | Facility: HOSPITAL | Age: 37
Discharge: HOME | End: 2022-11-02
Admitting: INTERNAL MEDICINE

## 2022-11-02 DIAGNOSIS — I47.29 NSVT (NONSUSTAINED VENTRICULAR TACHYCARDIA): ICD-10-CM

## 2022-11-02 PROCEDURE — 93226 XTRNL ECG REC<48 HR SCAN A/R: CPT

## 2022-11-02 PROCEDURE — 93225 XTRNL ECG REC<48 HRS REC: CPT

## 2022-11-04 ENCOUNTER — TELEPHONE (OUTPATIENT)
Dept: CARDIOLOGY | Facility: CLINIC | Age: 37
End: 2022-11-04

## 2022-11-04 LAB
MAXIMAL PREDICTED HEART RATE: 183 BPM
STRESS TARGET HR: 156 BPM

## 2022-11-04 PROCEDURE — 93227 XTRNL ECG REC<48 HR R&I: CPT | Performed by: INTERNAL MEDICINE

## 2022-11-04 NOTE — TELEPHONE ENCOUNTER
Spoke with pt, he verbalized understanding      ----- Message from Peter Peña III, MD sent at 11/4/2022  1:31 PM EDT -----  Please call- normal results

## 2023-04-18 ENCOUNTER — OFFICE VISIT (OUTPATIENT)
Dept: ONCOLOGY | Facility: CLINIC | Age: 38
End: 2023-04-18
Payer: COMMERCIAL

## 2023-04-18 ENCOUNTER — LAB (OUTPATIENT)
Dept: LAB | Facility: HOSPITAL | Age: 38
End: 2023-04-18
Payer: COMMERCIAL

## 2023-04-18 VITALS
RESPIRATION RATE: 18 BRPM | TEMPERATURE: 97.1 F | HEIGHT: 74 IN | SYSTOLIC BLOOD PRESSURE: 138 MMHG | WEIGHT: 226.1 LBS | DIASTOLIC BLOOD PRESSURE: 93 MMHG | HEART RATE: 72 BPM | OXYGEN SATURATION: 99 % | BODY MASS INDEX: 29.02 KG/M2

## 2023-04-18 DIAGNOSIS — R16.1 SPLENOMEGALY: Primary | ICD-10-CM

## 2023-04-18 DIAGNOSIS — R16.1 SPLENOMEGALY: ICD-10-CM

## 2023-04-18 LAB
BASOPHILS # BLD AUTO: 0.03 10*3/MM3 (ref 0–0.2)
BASOPHILS NFR BLD AUTO: 0.5 % (ref 0–1.5)
DEPRECATED RDW RBC AUTO: 41.1 FL (ref 37–54)
EOSINOPHIL # BLD AUTO: 0.27 10*3/MM3 (ref 0–0.4)
EOSINOPHIL NFR BLD AUTO: 4.3 % (ref 0.3–6.2)
ERYTHROCYTE [DISTWIDTH] IN BLOOD BY AUTOMATED COUNT: 13 % (ref 12.3–15.4)
HCT VFR BLD AUTO: 43.6 % (ref 37.5–51)
HGB BLD-MCNC: 14.3 G/DL (ref 13–17.7)
IMM GRANULOCYTES # BLD AUTO: 0.02 10*3/MM3 (ref 0–0.05)
IMM GRANULOCYTES NFR BLD AUTO: 0.3 % (ref 0–0.5)
LYMPHOCYTES # BLD AUTO: 2.33 10*3/MM3 (ref 0.7–3.1)
LYMPHOCYTES NFR BLD AUTO: 37 % (ref 19.6–45.3)
MCH RBC QN AUTO: 28.5 PG (ref 26.6–33)
MCHC RBC AUTO-ENTMCNC: 32.8 G/DL (ref 31.5–35.7)
MCV RBC AUTO: 87 FL (ref 79–97)
MONOCYTES # BLD AUTO: 0.35 10*3/MM3 (ref 0.1–0.9)
MONOCYTES NFR BLD AUTO: 5.6 % (ref 5–12)
NEUTROPHILS NFR BLD AUTO: 3.3 10*3/MM3 (ref 1.7–7)
NEUTROPHILS NFR BLD AUTO: 52.3 % (ref 42.7–76)
NRBC BLD AUTO-RTO: 0 /100 WBC (ref 0–0.2)
PLATELET # BLD AUTO: 178 10*3/MM3 (ref 140–450)
PMV BLD AUTO: 9.4 FL (ref 6–12)
RBC # BLD AUTO: 5.01 10*6/MM3 (ref 4.14–5.8)
WBC NRBC COR # BLD: 6.3 10*3/MM3 (ref 3.4–10.8)

## 2023-04-18 PROCEDURE — 85025 COMPLETE CBC W/AUTO DIFF WBC: CPT

## 2023-04-18 PROCEDURE — 36415 COLL VENOUS BLD VENIPUNCTURE: CPT

## 2023-04-18 RX ORDER — FLUTICASONE PROPIONATE 50 MCG
2 SPRAY, SUSPENSION (ML) NASAL DAILY
COMMUNITY

## 2023-04-18 NOTE — PROGRESS NOTES
[facility]    CONSULTING IN BLOOD DISORDERS & CANCER      REASON FOR CONSULTATION/CHIEF COMPLAINT:     Evaluation & management for splenomegaly                             REQUESTING PHYSICIAN: No ref. provider found  RECORDS OBTAINED:  Records of the patients history including those from the electronic medical record were reviewed and summarized in detail.    HISTORY OF PRESENT ILLNESS:    The patient is a 37 y.o. year old male with no major past medical history had presented to his PCP with RUQ abdominal pain, heart burns & rapid weight loss in September 2021. A CMP from 9/10/21 revealed elevated serum creatinine of 1.33. An US abdomen from 9/21/21 showed borderline enlarged spleen measuring 12.9 cm. Liver was unremarkable, measuring 16.8 cm. This prompted referral to this clinic.   Patient reports he is an avid runner & may have ran more than usual prior to presentation to his PCP last month. Says his RUQ pain has resolved now. Repeat serum creatinine too has normalized. An EGD performed in end of September 2021 showed signs of block's esophagus. No other acute abnormalities. He has been started on PPI.     Patient reported of mainly reports of left lower abdominal pain. Denies any fever, chills, night sweats or any weight loss. No palpable lymphadenopathy. Denies tobacco, alcohol or drug abuse. No family hx blood or bone marrow disorder.    March 2022: Us abd with no splenomegaly or lymphadenopathy.    INTERIM HISTORY:  Pt returns to the clinic for a f/u visit. Patient denies any major complaints today. Has some musculoskeletal soreness after workout. Denies fever, chills, night sweats or palpable LNs.    4/18/2023: Remains asymptomatic.  Was diagnosed with COVID 19 in fall of 2022.  After that he had an ER visit call nonsustained V. tach.  He had received news of his wife and pregnant around the same time.  Cardiac work-up with a stress test and echocardiogram were negative.  Follows up with  "cardiology.    Past Medical History:   Diagnosis Date   • Asthma    • Depression      Past Surgical History:   Procedure Laterality Date   • KNEE SURGERY         MEDICATIONS    Current Outpatient Medications:   •  [employer] Digestive Probiotic 250 MG capsule, Take 1 capsule by mouth Daily., Disp: , Rfl:   •  desonide (DESOWEN) 0.05 % cream, Apply  topically to the appropriate area as directed As Needed., Disp: , Rfl:   •  fluticasone (FLONASE) 50 MCG/ACT nasal spray, 2 sprays into the nostril(s) as directed by provider Daily., Disp: , Rfl:   •  multivitamin (THERAGRAN) tablet tablet, Take 1 tablet by mouth Daily., Disp: , Rfl:   •  Omega-3 Fatty Acids (fish oil) 1000 MG capsule capsule, Take  by mouth., Disp: , Rfl:   •  omeprazole (priLOSEC) 40 MG capsule, Take 1 capsule by mouth Daily., Disp: , Rfl:     ALLERGIES:   No Known Allergies    SOCIAL HISTORY:       Social History     Socioeconomic History   • Marital status:    Tobacco Use   • Smoking status: Former   • Smokeless tobacco: Never   Substance and Sexual Activity   • Alcohol use: Yes     Comment: occ   • Drug use: Never   • Sexual activity: Defer         FAMILY HISTORY:  Family History   Problem Relation Age of Onset   • No Known Problems Mother    • No Known Problems Father    • Asthma Sister    • Stroke Paternal Grandfather        REVIEW OF SYSTEMS:  As per HPI         Vitals:    04/18/23 1523   BP: 138/93   Pulse: 72   Resp: 18   Temp: 97.1 °F (36.2 °C)   TempSrc: Temporal   SpO2: 99%   Weight: 103 kg (226 lb 1.6 oz)   Height: 188 cm (74.02\")   PainSc: 0-No pain         4/18/2023     3:15 PM   Current Status   ECOG score 0      PHYSICAL EXAM:    CONSTITUTIONAL:  Vital signs reviewed.  No distress, looks comfortable.  EYES:  Conjunctiva and lids unremarkable.    EARS,NOSE,MOUTH,THROAT:  Ears and nose appear unremarkable.  Lips, teeth, gums appear unremarkable.  RESPIRATORY:  Normal respiratory effort.  Lungs clear to auscultation " bilaterally.  CARDIOVASCULAR:  Normal S1, S2.  No murmurs rubs or gallops.  No significant lower extremity edema  GASTROINTESTINAL: Abdomen appears unremarkable.  Nondistended  LYMPHATIC:  No cervical, supraclavicular, axillary lymphadenopathy.  SKIN:  Warm.  No rashes.  PSYCHIATRIC:  Normal judgment and insight.  Normal mood and affect.     RECENT LABS:        Lab on 04/18/2023   Component Date Value Ref Range Status   • WBC 04/18/2023 6.30  3.40 - 10.80 10*3/mm3 Final   • RBC 04/18/2023 5.01  4.14 - 5.80 10*6/mm3 Final   • Hemoglobin 04/18/2023 14.3  13.0 - 17.7 g/dL Final   • Hematocrit 04/18/2023 43.6  37.5 - 51.0 % Final   • MCV 04/18/2023 87.0  79.0 - 97.0 fL Final   • MCH 04/18/2023 28.5  26.6 - 33.0 pg Final   • MCHC 04/18/2023 32.8  31.5 - 35.7 g/dL Final   • RDW 04/18/2023 13.0  12.3 - 15.4 % Final   • RDW-SD 04/18/2023 41.1  37.0 - 54.0 fl Final   • MPV 04/18/2023 9.4  6.0 - 12.0 fL Final   • Platelets 04/18/2023 178  140 - 450 10*3/mm3 Final   • Neutrophil % 04/18/2023 52.3  42.7 - 76.0 % Final   • Lymphocyte % 04/18/2023 37.0  19.6 - 45.3 % Final   • Monocyte % 04/18/2023 5.6  5.0 - 12.0 % Final   • Eosinophil % 04/18/2023 4.3  0.3 - 6.2 % Final   • Basophil % 04/18/2023 0.5  0.0 - 1.5 % Final   • Immature Grans % 04/18/2023 0.3  0.0 - 0.5 % Final   • Neutrophils, Absolute 04/18/2023 3.30  1.70 - 7.00 10*3/mm3 Final   • Lymphocytes, Absolute 04/18/2023 2.33  0.70 - 3.10 10*3/mm3 Final   • Monocytes, Absolute 04/18/2023 0.35  0.10 - 0.90 10*3/mm3 Final   • Eosinophils, Absolute 04/18/2023 0.27  0.00 - 0.40 10*3/mm3 Final   • Basophils, Absolute 04/18/2023 0.03  0.00 - 0.20 10*3/mm3 Final   • Immature Grans, Absolute 04/18/2023 0.02  0.00 - 0.05 10*3/mm3 Final   • nRBC 04/18/2023 0.0  0.0 - 0.2 /100 WBC Final         ASSESSMENT:   is a pleasant 36 y/o WM with past medical history significant for anxiety/depression comes to this clinic for splenomegaly evaluation & management.     #  Splenomegaly  · Patient had presented with RUQ abdominal pain, heart burn, rapid weight loss in early September 2021. Serum creatinine had was elevated at 1.33. US abdomen showed borderline enlarged splenomegaly.   · Discussed various causes for splenomegaly, including infectious (HIV, hepatitis, EBV etc), hemolytic disease/extramedullary hematopoiesis, infiltrative (sarcoidosis, gaucher's, SLE etc), thrombosis and hematologic malignancies.   · Patient does not have any acute abdominal pain, fever, chills, night sweats or weight loss at this time. In fact, the only symptom he reports of today is mild left lower abdominal pain. He does not have any risk factors for HIV or hepatitis infections. LFTs within normal limits. Hemoglobin stable.   · Low suspicion for lymphoma or any other heme malignancy. Suspect borderline enlarged splenomegaly could have been related to reactive finding.  · A CT a/p performed 10/5/21 showed borderline increased spleen size of 12.4 cm. No lymphadenopathy or any other acute abnormalities. Informed patient that his increased spleen size is likely a normal variation due to based on gender and weight/height. Taller, heavier & men usually have bigger spleen size. Patient does not have any signs/symptoms of lymphoproliferative diseases.   · Discussed plan to monitor for now. A repeat US abd from March 2022 too shows normal spleen size. Discussed plan to monitor for now.   · April 2023: Patient remains asymptomatic.  Will monitor.  Advised close follow-up with PCP patient was advised to call us if he has any new/persistent symptoms.     # Lymphocytosis:  Pt was noted to have increased lymphocyte percentage on the differential, although normal WBC & absolute lymphocyte count.  Suspect reactive.   Repeat CBC shows normal ALC.  Monitor.    #Nonsustained ventricular tachycardia: As per cardiology.    PLAN:   - Monitor for splenomegaly.  -Advised follow-up with cardiology.  -We will plan to follow-up  in this clinic on as needed basis.    Orders Placed This Encounter   Procedures   • CBC & Differential     Standing Status:   Future     Number of Occurrences:   1     Standing Expiration Date:   4/18/2024     Order Specific Question:   Manual Differential     Answer:   No     Order Specific Question:   Release to patient     Answer:   Routine Release

## 2023-06-01 ENCOUNTER — TELEPHONE (OUTPATIENT)
Dept: CARDIOLOGY | Facility: CLINIC | Age: 38
End: 2023-06-01
Payer: COMMERCIAL

## 2023-06-01 NOTE — TELEPHONE ENCOUNTER
Pt states that he was told today that he would need cardiac risk assessment for surgery scheduled for tomorrow 6/2/23    Pt is to have deviated septum repair with Dr. Yip. Pt was last seen in 11/2022.    I will be out of the office starting tomorrow, CLAUDINE Arreola        Assessment:      Assessment            Diagnosis Plan   1. NSVT (nonsustained ventricular tachycardia)  Holter Monitor - 24 Hour                   Plan:      Plan       1.  Nonsustained ventricular tachycardia, patient reports substantial improvement since 20 initially was feeling symptoms earlier in the month, we are going to place another Holter monitor.  Also review his case with Dr. Jean of our EP clinic.     Thank you very much for allowing us to participate in the care of this pleasant patient.  Please don't hesitate to call if I can be of assistance in any way.        Current Outpatient Medications:   •  [employer] Digestive Probiotic 250 MG capsule, Take 250 mg by mouth Daily., Disp: , Rfl:   •  desonide (DESOWEN) 0.05 % cream, Apply  topically to the appropriate area as directed As Needed., Disp: , Rfl:   •  multivitamin (THERAGRAN) tablet tablet, Take 1 tablet by mouth Daily., Disp: , Rfl:   •  Omega-3 Fatty Acids (fish oil) 1000 MG capsule capsule, Take  by mouth., Disp: , Rfl:   •  omeprazole (priLOSEC) 40 MG capsule, Take 40 mg by mouth Daily., Disp: , Rfl:

## 2023-06-05 NOTE — TELEPHONE ENCOUNTER
Faxed sx clearance letter to Dr. Yip's office at [phone]. Received fax confirmation.     Elba Terrazas MA  6/5/23 920B

## 2024-09-02 ENCOUNTER — APPOINTMENT (OUTPATIENT)
Dept: GENERAL RADIOLOGY | Facility: HOSPITAL | Age: 39
End: 2024-09-02
Payer: COMMERCIAL

## 2024-09-02 ENCOUNTER — HOSPITAL ENCOUNTER (EMERGENCY)
Facility: HOSPITAL | Age: 39
Discharge: HOME | End: 2024-09-02
Attending: EMERGENCY MEDICINE
Payer: COMMERCIAL

## 2024-09-02 VITALS
BODY MASS INDEX: 28.23 KG/M2 | RESPIRATION RATE: 20 BRPM | DIASTOLIC BLOOD PRESSURE: 74 MMHG | TEMPERATURE: 99.5 F | HEART RATE: 81 BPM | WEIGHT: 220 LBS | OXYGEN SATURATION: 97 % | HEIGHT: 74 IN | SYSTOLIC BLOOD PRESSURE: 148 MMHG

## 2024-09-02 DIAGNOSIS — J18.9 PNEUMONIA DUE TO INFECTIOUS ORGANISM, UNSPECIFIED LATERALITY, UNSPECIFIED PART OF LUNG: Primary | ICD-10-CM

## 2024-09-02 LAB — SARS-COV-2 RNA RESP QL NAA+PROBE: NOT DETECTED

## 2024-09-02 PROCEDURE — 71045 X-RAY EXAM CHEST 1 VIEW: CPT

## 2024-09-02 PROCEDURE — 99283 EMERGENCY DEPT VISIT LOW MDM: CPT | Performed by: EMERGENCY MEDICINE

## 2024-09-02 PROCEDURE — 87635 SARS-COV-2 COVID-19 AMP PRB: CPT | Performed by: EMERGENCY MEDICINE

## 2024-09-02 PROCEDURE — 94640 AIRWAY INHALATION TREATMENT: CPT

## 2024-09-02 RX ORDER — AZITHROMYCIN 250 MG/1
500 TABLET, FILM COATED ORAL ONCE
Status: COMPLETED | OUTPATIENT
Start: 2024-09-02 | End: 2024-09-02

## 2024-09-02 RX ORDER — IPRATROPIUM BROMIDE AND ALBUTEROL SULFATE 2.5; .5 MG/3ML; MG/3ML
3 SOLUTION RESPIRATORY (INHALATION) ONCE
Status: COMPLETED | OUTPATIENT
Start: 2024-09-02 | End: 2024-09-02

## 2024-09-02 RX ORDER — AZITHROMYCIN 250 MG/1
250 TABLET, FILM COATED ORAL DAILY
Qty: 5 TABLET | Refills: 0 | Status: SHIPPED | OUTPATIENT
Start: 2024-09-02

## 2024-09-02 RX ORDER — ALBUTEROL SULFATE 90 UG/1
2 AEROSOL, METERED RESPIRATORY (INHALATION) EVERY 4 HOURS PRN
Qty: 8.5 G | Refills: 0 | Status: SHIPPED | OUTPATIENT
Start: 2024-09-02

## 2024-09-02 RX ADMIN — IPRATROPIUM BROMIDE AND ALBUTEROL SULFATE 3 ML: .5; 3 SOLUTION RESPIRATORY (INHALATION) at 21:05

## 2024-09-02 RX ADMIN — AZITHROMYCIN DIHYDRATE 500 MG: 250 TABLET ORAL at 21:20

## 2024-09-03 NOTE — ED PROVIDER NOTES
Subjective   History of Present Illness  Patient states on Saturday him and his son both woke up with similar symptoms of a cough and fever.  He states his fever was 102 last night but seem to break on its own today.  He still having some mild wheezing and shortness of breath so wanted to get checked out.  He states he had asthma when he was a kid and occasionally when he gets allergies or cold she gets some wheezing and usually a breathing treatment helps clear him up.  He denies any chest pain, sore throat, hemoptysis, abdominal pain, diarrhea, vomiting or UTI symptoms.  He has no other complaints at this time.      Review of Systems   Constitutional:  Positive for fever. Negative for chills.   HENT:  Negative for congestion, sinus pain and sore throat.    Eyes:  Negative for visual disturbance.   Respiratory:  Positive for shortness of breath and wheezing. Negative for chest tightness.    Cardiovascular:  Negative for chest pain.   Gastrointestinal:  Negative for abdominal pain, constipation, diarrhea, nausea and vomiting.   Genitourinary:  Negative for dysuria, flank pain, hematuria and urgency.   Musculoskeletal:  Negative for myalgias.   Skin:  Negative for rash.   Neurological:  Negative for syncope, numbness and headaches.   Psychiatric/Behavioral:  Negative for confusion.        Past Medical History:   Diagnosis Date    Asthma     Depression        No Known Allergies    Past Surgical History:   Procedure Laterality Date    KNEE SURGERY         Family History   Problem Relation Age of Onset    No Known Problems Mother     No Known Problems Father     Asthma Sister     Stroke Paternal Grandfather        Social History     Socioeconomic History    Marital status:    Tobacco Use    Smoking status: Former    Smokeless tobacco: Never   Substance and Sexual Activity    Alcohol use: Yes     Comment: occ    Drug use: Never    Sexual activity: Defer           Objective   Physical Exam  Constitutional:        Appearance: He is well-developed.   HENT:      Head: Normocephalic and atraumatic.      Nose: Nose normal.      Mouth/Throat:      Pharynx: No oropharyngeal exudate.   Eyes:      Conjunctiva/sclera: Conjunctivae normal.      Pupils: Pupils are equal, round, and reactive to light.   Neck:      Vascular: No JVD.      Trachea: No tracheal deviation.   Cardiovascular:      Rate and Rhythm: Normal rate and regular rhythm.      Heart sounds: Normal heart sounds. No murmur heard.     No friction rub. No gallop.   Pulmonary:      Effort: Pulmonary effort is normal. No respiratory distress.      Breath sounds: Normal breath sounds. No stridor. No wheezing or rales.   Abdominal:      General: Bowel sounds are normal. There is no distension.      Palpations: Abdomen is soft. There is no mass.      Tenderness: There is no abdominal tenderness. There is no guarding or rebound.      Hernia: No hernia is present.   Musculoskeletal:         General: No tenderness or deformity.      Cervical back: Normal range of motion and neck supple.   Lymphadenopathy:      Cervical: No cervical adenopathy.   Skin:     General: Skin is warm and dry.      Capillary Refill: Capillary refill takes less than 2 seconds.   Neurological:      Mental Status: He is alert and oriented to person, place, and time.      Cranial Nerves: No cranial nerve deficit.      Sensory: No sensory deficit.      Motor: No abnormal muscle tone.   Psychiatric:         Behavior: Behavior normal.         Thought Content: Thought content normal.         Judgment: Judgment normal.         Procedures           ED Course                                             Medical Decision Making  Problems Addressed:  Pneumonia due to infectious organism, unspecified laterality, unspecified part of lung: complicated acute illness or injury    Amount and/or Complexity of Data Reviewed  Radiology: ordered.    Risk  Prescription drug management.        Final diagnoses:   Pneumonia due to  infectious organism, unspecified laterality, unspecified part of lung       ED Disposition  ED Disposition       ED Disposition   Discharge    Condition   Stable    Comment   --               Martin Singer MD  [address]  [place]  [facility] [address]  [phone]    In 2 days           Medication List        New Prescriptions      albuterol sulfate  (90 Base) MCG/ACT inhaler  Commonly known as: PROVENTIL HFA;VENTOLIN HFA;PROAIR HFA  Inhale 2 puffs Every 4 (Four) Hours As Needed for Wheezing.     azithromycin 250 MG tablet  Commonly known as: ZITHROMAX  Take 1 tablet by mouth Daily.               Where to Get Your Medications        These medications were sent to [facility] [id number] - [place] - [address] [facility] [address] [facility] [address] - [phone] [facility] [phone]   [address] [address]      Phone: [phone]   albuterol sulfate  (90 Base) MCG/ACT inhaler  azithromycin 250 MG tablet            Elieser Garsia,   09/02/24 2105       Elieser Garsia DO  09/02/24 2106

## 2024-09-03 NOTE — DISCHARGE INSTRUCTIONS
Follow closely with your family physician for recheck.  Use the medications as directed and use Tylenol for fevers.  Return immediately to the ER with any increasing shortness of breath, chest pains or any other concerns.

## 2024-10-17 ENCOUNTER — OFFICE VISIT (OUTPATIENT)
Age: 39
End: 2024-10-17
Payer: COMMERCIAL

## 2024-10-17 VITALS
WEIGHT: 226 LBS | HEIGHT: 74 IN | TEMPERATURE: 96 F | OXYGEN SATURATION: 96 % | SYSTOLIC BLOOD PRESSURE: 142 MMHG | DIASTOLIC BLOOD PRESSURE: 78 MMHG | RESPIRATION RATE: 14 BRPM | HEART RATE: 69 BPM | BODY MASS INDEX: 29 KG/M2

## 2024-10-17 DIAGNOSIS — R30.0 DYSURIA: ICD-10-CM

## 2024-10-17 DIAGNOSIS — Z00.01 ENCOUNTER FOR PREVENTATIVE ADULT HEALTH CARE EXAM WITH ABNORMAL FINDINGS: Primary | ICD-10-CM

## 2024-10-17 DIAGNOSIS — I10 ESSENTIAL HYPERTENSION: ICD-10-CM

## 2024-10-17 PROBLEM — F41.1 GENERALIZED ANXIETY DISORDER: Status: ACTIVE | Noted: 2017-03-17

## 2024-10-17 PROBLEM — M51.26 DISPLACEMENT OF LUMBAR INTERVERTEBRAL DISC WITHOUT MYELOPATHY: Status: ACTIVE | Noted: 2024-10-17

## 2024-10-17 PROBLEM — M47.816 LUMBAR SPONDYLOSIS: Status: ACTIVE | Noted: 2024-10-17

## 2024-10-17 PROBLEM — F32.0 MILD MAJOR DEPRESSION: Status: ACTIVE | Noted: 2018-01-11

## 2024-10-17 PROBLEM — J45.909 ASTHMA: Status: ACTIVE | Noted: 2018-12-04

## 2024-10-17 PROBLEM — M54.17 LUMBOSACRAL RADICULITIS: Status: ACTIVE | Noted: 2024-10-17

## 2024-10-17 PROCEDURE — 99385 PREV VISIT NEW AGE 18-39: CPT | Performed by: FAMILY MEDICINE

## 2024-10-17 RX ORDER — LISINOPRIL 10 MG/1
10 TABLET ORAL DAILY
Qty: 90 TABLET | Refills: 3 | Status: SHIPPED | OUTPATIENT
Start: 2024-10-17

## 2024-10-17 NOTE — PROGRESS NOTES
"Chief Complaint  Annual Exam    Subjective        Lance Healy presents to [facility] PRIMARY [facility]  History of Present Illness    Objective   Vital Signs:  /78 (BP Location: Right leg, Patient Position: Sitting, Cuff Size: Adult)   Pulse 69   Temp 96 °F (35.6 °C) (Temporal)   Resp 14   Ht 188 cm (74.02\")   Wt 103 kg (226 lb)   SpO2 96%   BMI 29.00 kg/m²   Estimated body mass index is 29 kg/m² as calculated from the following:    Height as of this encounter: 188 cm (74.02\").    Weight as of this encounter: 103 kg (226 lb).          Physical Exam  Constitutional:       General: He is not in acute distress.     Appearance: Normal appearance. He is not ill-appearing, toxic-appearing or diaphoretic.   HENT:      Head: Normocephalic and atraumatic.      Right Ear: There is no impacted cerumen.      Left Ear: There is no impacted cerumen.      Nose: No congestion or rhinorrhea.      Mouth/Throat:      Pharynx: Oropharynx is clear. No oropharyngeal exudate or posterior oropharyngeal erythema.   Eyes:      General: No scleral icterus.        Right eye: No discharge.         Left eye: No discharge.      Extraocular Movements: Extraocular movements intact.      Conjunctiva/sclera: Conjunctivae normal.      Pupils: Pupils are equal, round, and reactive to light.   Cardiovascular:      Rate and Rhythm: Normal rate and regular rhythm.      Pulses: Normal pulses.      Heart sounds: Normal heart sounds.   Pulmonary:      Effort: Pulmonary effort is normal.      Breath sounds: Normal breath sounds.   Abdominal:      General: Abdomen is flat. Bowel sounds are normal.      Palpations: Abdomen is soft.   Musculoskeletal:         General: Normal range of motion.      Cervical back: Normal range of motion and neck supple.   Skin:     General: Skin is warm.   Neurological:      General: No focal deficit present.      Mental Status: He is alert and oriented to person, place, and time. Mental status is at " baseline.   Psychiatric:         Mood and Affect: Mood normal.         Behavior: Behavior normal.         Thought Content: Thought content normal.         Judgment: Judgment normal.        Result Review :                Patient Counseling:  --Nutrition: Stressed importance of moderation in sodium/caffeine intake, saturated fat and cholesterol, caloric balance, sufficient intake of fresh fruits, vegetables, fiber, calcium, iron, and 1 mg of folate supplement per day (for females capable of pregnancy).  --Discussed  calcium supplement, and the daily use of baby aspirin.  --Exercise: Stressed the importance of regular exercise.   --Substance Abuse: Discussed cessation/primary prevention of tobacco, alcohol, or other drug use; driving or other dangerous activities under the influence; availability of treatment for abuse.    --Sexuality: Discussed sexually transmitted diseases, partner selection, use of condoms, avoidance of unintended pregnancy  and contraceptive alternatives.   --Injury prevention: Discussed safety belts, safety helmets, smoke detector, smoking near bedding or upholstery.   --Dental health: Discussed importance of regular tooth brushing, flossing, and dental visits.  --Immunizations reviewed.  --Discussed benefits of screening colonoscopy.  --After hours service discussed with patient       Assessment and Plan   Diagnoses and all orders for this visit:    1. Encounter for preventative adult health care exam with abnormal findings (Primary)    2. Essential hypertension  -     Basic Metabolic Panel  -     CBC & Differential  -     Hepatic Function Panel (6) ([facility])  -     Hemoglobin A1c  -     Lipid Panel  -     lisinopril (PRINIVIL,ZESTRIL) 10 MG tablet; Take 1 tablet by mouth Daily.  Dispense: 90 tablet; Refill: 3    3. Dysuria  -     PSA Diagnostic; Future             Follow Up   No follow-ups on file.  Patient was given instructions and counseling regarding his condition or for health maintenance  advice. Please see specific information pulled into the AVS if appropriate.

## 2024-10-19 LAB
ALBUMIN SERPL-MCNC: 4.5 G/DL (ref 4.1–5.1)
ALP SERPL-CCNC: 57 IU/L (ref 44–121)
ALT SERPL-CCNC: 17 IU/L (ref 0–44)
AST SERPL-CCNC: 17 IU/L (ref 0–40)
BASOPHILS # BLD AUTO: 0 X10E3/UL (ref 0–0.2)
BASOPHILS NFR BLD AUTO: 1 %
BILIRUB DIRECT SERPL-MCNC: 0.15 MG/DL (ref 0–0.4)
BILIRUB SERPL-MCNC: 0.5 MG/DL (ref 0–1.2)
BUN SERPL-MCNC: 18 MG/DL (ref 6–20)
BUN/CREAT SERPL: 17 (ref 9–20)
CALCIUM SERPL-MCNC: 9.8 MG/DL (ref 8.7–10.2)
CHLORIDE SERPL-SCNC: 101 MMOL/L (ref 96–106)
CHOLEST SERPL-MCNC: 221 MG/DL (ref 100–199)
CO2 SERPL-SCNC: 25 MMOL/L (ref 20–29)
CREAT SERPL-MCNC: 1.09 MG/DL (ref 0.76–1.27)
EGFRCR SERPLBLD CKD-EPI 2021: 89 ML/MIN/1.73
EOSINOPHIL # BLD AUTO: 0.3 X10E3/UL (ref 0–0.4)
EOSINOPHIL NFR BLD AUTO: 4 %
ERYTHROCYTE [DISTWIDTH] IN BLOOD BY AUTOMATED COUNT: 13.1 % (ref 11.6–15.4)
GLUCOSE SERPL-MCNC: 112 MG/DL (ref 70–99)
HBA1C MFR BLD: 5.6 % (ref 4.8–5.6)
HCT VFR BLD AUTO: 44.9 % (ref 37.5–51)
HDLC SERPL-MCNC: 78 MG/DL
HGB BLD-MCNC: 14.5 G/DL (ref 13–17.7)
IMM GRANULOCYTES # BLD AUTO: 0 X10E3/UL (ref 0–0.1)
IMM GRANULOCYTES NFR BLD AUTO: 0 %
LDLC SERPL CALC-MCNC: 120 MG/DL (ref 0–99)
LYMPHOCYTES # BLD AUTO: 2.1 X10E3/UL (ref 0.7–3.1)
LYMPHOCYTES NFR BLD AUTO: 32 %
MCH RBC QN AUTO: 29.1 PG (ref 26.6–33)
MCHC RBC AUTO-ENTMCNC: 32.3 G/DL (ref 31.5–35.7)
MCV RBC AUTO: 90 FL (ref 79–97)
MONOCYTES # BLD AUTO: 0.3 X10E3/UL (ref 0.1–0.9)
MONOCYTES NFR BLD AUTO: 4 %
NEUTROPHILS # BLD AUTO: 3.8 X10E3/UL (ref 1.4–7)
NEUTROPHILS NFR BLD AUTO: 59 %
PLATELET # BLD AUTO: 220 X10E3/UL (ref 150–450)
POTASSIUM SERPL-SCNC: 4.4 MMOL/L (ref 3.5–5.2)
RBC # BLD AUTO: 4.99 X10E6/UL (ref 4.14–5.8)
SODIUM SERPL-SCNC: 139 MMOL/L (ref 134–144)
TRIGL SERPL-MCNC: 133 MG/DL (ref 0–149)
VLDLC SERPL CALC-MCNC: 23 MG/DL (ref 5–40)
WBC # BLD AUTO: 6.4 X10E3/UL (ref 3.4–10.8)

## 2024-11-22 DIAGNOSIS — K21.00 GASTRO-ESOPHAGEAL REFLUX DISEASE WITH ESOPHAGITIS, WITHOUT BLEEDING: ICD-10-CM

## 2024-11-22 RX ORDER — OMEPRAZOLE 40 MG/1
40 CAPSULE, DELAYED RELEASE ORAL DAILY
Qty: 90 CAPSULE | Refills: 3 | Status: SHIPPED | OUTPATIENT
Start: 2024-11-22

## 2024-12-23 ENCOUNTER — TELEPHONE (OUTPATIENT)
Age: 39
End: 2024-12-23
Payer: COMMERCIAL

## 2024-12-23 NOTE — TELEPHONE ENCOUNTER
Pt calling about medication interaction, he is asking if he can take Ibprofen with his b/p meds. I told him there isn't a interaction with the 2 but its not recommended to us NSAIDS due to causing elevated b/p and not good on your kidneys. So I advised pt to take Tylenol if needed versus Ibprofen if he can prevent it. Pt verbalized understanding.

## 2025-02-04 ENCOUNTER — OFFICE VISIT (OUTPATIENT)
Age: 40
End: 2025-02-04
Payer: COMMERCIAL

## 2025-02-04 VITALS
RESPIRATION RATE: 14 BRPM | HEART RATE: 98 BPM | SYSTOLIC BLOOD PRESSURE: 128 MMHG | DIASTOLIC BLOOD PRESSURE: 72 MMHG | WEIGHT: 223 LBS | TEMPERATURE: 97 F | BODY MASS INDEX: 28.62 KG/M2 | HEIGHT: 74 IN | OXYGEN SATURATION: 100 %

## 2025-02-04 DIAGNOSIS — I10 ESSENTIAL HYPERTENSION: ICD-10-CM

## 2025-02-04 DIAGNOSIS — M25.421 EFFUSION OF RIGHT ELBOW: Primary | ICD-10-CM

## 2025-02-04 PROCEDURE — 20610 DRAIN/INJ JOINT/BURSA W/O US: CPT | Performed by: FAMILY MEDICINE

## 2025-02-04 PROCEDURE — 99213 OFFICE O/P EST LOW 20 MIN: CPT | Performed by: FAMILY MEDICINE

## 2025-02-04 NOTE — PROGRESS NOTES
"Chief Complaint  right elbow pain (Pain and swelling 10 days)    Subjective        Lance Healy presents to [facility]  History of Present Illness  Patient comes in with concerns of his hypertension he comes in with his blood pressure diary most of the blood pressures that he has shown is here today are in the range of less than 130 and less than 80 and occasional blood pressure of 140/90.  History of Present Illness  The patient presents for evaluation of fluid accumulation in his elbow.    He reports a persistent accumulation of fluid in his elbow, which he attributes to an incident where he inadvertently struck the corner of a high-frequency vibration device. The onset of swelling was noted the following day. He recalls a similar episode in the past, which resolved spontaneously within a 2-day period. He expresses interest in understanding the etiology of this condition.       Objective   Vital Signs:  /72 (BP Location: Left arm, Patient Position: Sitting, Cuff Size: Adult)   Pulse 98   Temp 97 °F (36.1 °C) (Temporal)   Resp 14   Ht 188 cm (74.02\")   Wt 101 kg (223 lb)   SpO2 100%   BMI 28.62 kg/m²   Estimated body mass index is 28.62 kg/m² as calculated from the following:    Height as of this encounter: 188 cm (74.02\").    Weight as of this encounter: 101 kg (223 lb).    BMI is >= 25 and <30. (Overweight) The following options were offered after discussion;: weight loss educational material (shared in after visit summary), exercise counseling/recommendations, and nutrition counseling/recommendations       Physical Exam  Constitutional:       General: He is not in acute distress.     Appearance: Normal appearance. He is not ill-appearing, toxic-appearing or diaphoretic.   HENT:      Head: Normocephalic and atraumatic.      Right Ear: There is no impacted cerumen.      Left Ear: There is no impacted cerumen.      Nose: No congestion or rhinorrhea.      Mouth/Throat:     "  Pharynx: Oropharynx is clear. No oropharyngeal exudate or posterior oropharyngeal erythema.   Eyes:      General: No scleral icterus.        Right eye: No discharge.         Left eye: No discharge.      Extraocular Movements: Extraocular movements intact.      Conjunctiva/sclera: Conjunctivae normal.      Pupils: Pupils are equal, round, and reactive to light.   Cardiovascular:      Rate and Rhythm: Normal rate and regular rhythm.      Pulses: Normal pulses.      Heart sounds: Normal heart sounds.   Pulmonary:      Effort: Pulmonary effort is normal.      Breath sounds: Normal breath sounds.   Abdominal:      General: Abdomen is flat. Bowel sounds are normal.      Palpations: Abdomen is soft.   Musculoskeletal:         General: Normal range of motion.      Right elbow: Effusion present.      Cervical back: Normal range of motion and neck supple.   Skin:     General: Skin is warm.   Neurological:      General: No focal deficit present.      Mental Status: He is alert and oriented to person, place, and time. Mental status is at baseline.   Psychiatric:         Mood and Affect: Mood normal.         Behavior: Behavior normal.         Thought Content: Thought content normal.         Judgment: Judgment normal.                    Result Review :    Results                Assessment and Plan   Diagnoses and all orders for this visit:    1. Effusion of right elbow (Primary)    2. Essential hypertension  -     Basic Metabolic Panel      #1 informed consent was obtained #2 the patient was positioned comfortably with the elbow flexed at 90 degrees #3 1% lidocaine was used for anesthesia #4 an 18-gauge needle a 10 mL syringe was inserted lateral to the olecranon slightly medial and anteriorly toward the synovial space.  Approximately 8 cc of clear brownish fluid was removed the needle was withdrawn puncture site was applied a 2 x 2 sterile bandage was placed patient was observed and tolerated the procedure well.  Assessment &  Plan  1. Elbow effusion.  The patient's symptoms are indicative of a disruption in the tissue layer between the bone and the elbow, leading to fluid accumulation. An 18-gauge needle will be utilized for the aspiration of the fluid. Post-procedure, a compression bandage will be applied to prevent re-accumulation of the fluid. The patient has been informed about the potential for the fluid to re-accumulate even after aspiration.     2. Essential hypertension.  Recheck BMP Continue Lisinopril.  His blood pressure is well-controlled we will continue his current treatment         Follow Up   Return in about 3 months (around 5/4/2025).  Patient was given instructions and counseling regarding his condition or for health maintenance advice. Please see specific information pulled into the AVS if appropriate.         Patient or patient representative verbalized consent for the use of Ambient Listening during the visit with  Martin Singer MD for chart documentation. 2/4/2025  10:23 EST

## 2025-02-05 LAB
BUN SERPL-MCNC: 13 MG/DL (ref 6–20)
BUN/CREAT SERPL: 13 (ref 9–20)
CALCIUM SERPL-MCNC: 9.8 MG/DL (ref 8.7–10.2)
CHLORIDE SERPL-SCNC: 101 MMOL/L (ref 96–106)
CO2 SERPL-SCNC: 24 MMOL/L (ref 20–29)
CREAT SERPL-MCNC: 1 MG/DL (ref 0.76–1.27)
EGFRCR SERPLBLD CKD-EPI 2021: 98 ML/MIN/1.73
GLUCOSE SERPL-MCNC: 92 MG/DL (ref 70–99)
POTASSIUM SERPL-SCNC: 4.3 MMOL/L (ref 3.5–5.2)
SODIUM SERPL-SCNC: 140 MMOL/L (ref 134–144)

## 2025-06-03 DIAGNOSIS — K21.00 GASTRO-ESOPHAGEAL REFLUX DISEASE WITH ESOPHAGITIS, WITHOUT BLEEDING: ICD-10-CM

## 2025-06-03 RX ORDER — OMEPRAZOLE 40 MG/1
40 CAPSULE, DELAYED RELEASE ORAL DAILY
Qty: 90 CAPSULE | Refills: 3 | Status: SHIPPED | OUTPATIENT
Start: 2025-06-03